# Patient Record
Sex: MALE | Race: BLACK OR AFRICAN AMERICAN | Employment: UNEMPLOYED | URBAN - METROPOLITAN AREA
[De-identification: names, ages, dates, MRNs, and addresses within clinical notes are randomized per-mention and may not be internally consistent; named-entity substitution may affect disease eponyms.]

---

## 2017-03-29 ENCOUNTER — HOSPITAL ENCOUNTER (EMERGENCY)
Age: 47
Discharge: ANOTHER HOSPITAL | End: 2017-03-29
Attending: EMERGENCY MEDICINE
Payer: COMMERCIAL

## 2017-03-29 ENCOUNTER — APPOINTMENT (OUTPATIENT)
Dept: GENERAL RADIOLOGY | Age: 47
End: 2017-03-29
Attending: EMERGENCY MEDICINE
Payer: COMMERCIAL

## 2017-03-29 ENCOUNTER — HOSPITAL ENCOUNTER (OUTPATIENT)
Age: 47
Setting detail: OBSERVATION
Discharge: HOME | DRG: 287 | End: 2017-03-30
Attending: INTERNAL MEDICINE | Admitting: INTERNAL MEDICINE
Payer: COMMERCIAL

## 2017-03-29 VITALS
DIASTOLIC BLOOD PRESSURE: 94 MMHG | OXYGEN SATURATION: 98 % | BODY MASS INDEX: 29.62 KG/M2 | HEART RATE: 78 BPM | HEIGHT: 69 IN | TEMPERATURE: 98.1 F | SYSTOLIC BLOOD PRESSURE: 152 MMHG | RESPIRATION RATE: 20 BRPM | WEIGHT: 200 LBS

## 2017-03-29 DIAGNOSIS — R07.9 CHEST PAIN, UNSPECIFIED TYPE: Primary | ICD-10-CM

## 2017-03-29 PROBLEM — R06.02 SHORTNESS OF BREATH: Status: ACTIVE | Noted: 2017-03-29

## 2017-03-29 LAB
ALBUMIN SERPL BCP-MCNC: 4.1 G/DL (ref 3.5–5)
ALBUMIN/GLOB SERPL: 1 {RATIO} (ref 1.2–3.5)
ALP SERPL-CCNC: 74 U/L (ref 50–136)
ALT SERPL-CCNC: 79 U/L (ref 12–65)
ANION GAP BLD CALC-SCNC: 9 MMOL/L (ref 7–16)
AST SERPL W P-5'-P-CCNC: 45 U/L (ref 15–37)
ATRIAL RATE: 64 BPM
BASOPHILS # BLD AUTO: 0 K/UL (ref 0–0.2)
BASOPHILS # BLD: 0 % (ref 0–2)
BILIRUB SERPL-MCNC: 0.2 MG/DL (ref 0.2–1.1)
BNP SERPL-MCNC: 5 PG/ML
BUN SERPL-MCNC: 10 MG/DL (ref 6–23)
CALCIUM SERPL-MCNC: 9.2 MG/DL (ref 8.3–10.4)
CALCULATED P AXIS, ECG09: 78 DEGREES
CALCULATED R AXIS, ECG10: 34 DEGREES
CALCULATED T AXIS, ECG11: 46 DEGREES
CHLORIDE SERPL-SCNC: 105 MMOL/L (ref 98–107)
CO2 SERPL-SCNC: 26 MMOL/L (ref 21–32)
CREAT SERPL-MCNC: 1.19 MG/DL (ref 0.8–1.5)
D DIMER PPP FEU-MCNC: 0.36 UG/ML(FEU)
DIAGNOSIS, 93000: NORMAL
DIFFERENTIAL METHOD BLD: ABNORMAL
EOSINOPHIL # BLD: 0.2 K/UL (ref 0–0.8)
EOSINOPHIL NFR BLD: 4 % (ref 0.5–7.8)
ERYTHROCYTE [DISTWIDTH] IN BLOOD BY AUTOMATED COUNT: 14.6 % (ref 11.9–14.6)
GLOBULIN SER CALC-MCNC: 4.3 G/DL (ref 2.3–3.5)
GLUCOSE SERPL-MCNC: 98 MG/DL (ref 65–100)
HCT VFR BLD AUTO: 42.2 % (ref 41.1–50.3)
HGB BLD-MCNC: 14.3 G/DL (ref 13.6–17.2)
IMM GRANULOCYTES # BLD: 0 K/UL (ref 0–0.5)
IMM GRANULOCYTES NFR BLD AUTO: 0 % (ref 0–5)
LYMPHOCYTES # BLD AUTO: 47 % (ref 13–44)
LYMPHOCYTES # BLD: 2.4 K/UL (ref 0.5–4.6)
MCH RBC QN AUTO: 26.7 PG (ref 26.1–32.9)
MCHC RBC AUTO-ENTMCNC: 33.9 G/DL (ref 31.4–35)
MCV RBC AUTO: 78.9 FL (ref 79.6–97.8)
MONOCYTES # BLD: 0.7 K/UL (ref 0.1–1.3)
MONOCYTES NFR BLD AUTO: 14 % (ref 4–12)
NEUTS SEG # BLD: 1.8 K/UL (ref 1.7–8.2)
NEUTS SEG NFR BLD AUTO: 35 % (ref 43–78)
P-R INTERVAL, ECG05: 144 MS
PLATELET # BLD AUTO: 227 K/UL (ref 150–450)
PMV BLD AUTO: 12.2 FL (ref 10.8–14.1)
POTASSIUM SERPL-SCNC: 4 MMOL/L (ref 3.5–5.1)
PROT SERPL-MCNC: 8.4 G/DL (ref 6.3–8.2)
Q-T INTERVAL, ECG07: 396 MS
QRS DURATION, ECG06: 92 MS
QTC CALCULATION (BEZET), ECG08: 408 MS
RBC # BLD AUTO: 5.35 M/UL (ref 4.23–5.67)
SODIUM SERPL-SCNC: 140 MMOL/L (ref 136–145)
TROPONIN I BLD-MCNC: 0.01 NG/ML (ref 0–0.08)
TROPONIN I SERPL-MCNC: 0.04 NG/ML (ref 0.02–0.05)
VENTRICULAR RATE, ECG03: 64 BPM
WBC # BLD AUTO: 5.1 K/UL (ref 4.3–11.1)

## 2017-03-29 PROCEDURE — 99218 HC RM OBSERVATION: CPT

## 2017-03-29 PROCEDURE — 74011250637 HC RX REV CODE- 250/637: Performed by: NURSE PRACTITIONER

## 2017-03-29 PROCEDURE — 65660000000 HC RM CCU STEPDOWN

## 2017-03-29 PROCEDURE — 74011250636 HC RX REV CODE- 250/636: Performed by: NURSE PRACTITIONER

## 2017-03-29 PROCEDURE — 74011250637 HC RX REV CODE- 250/637: Performed by: EMERGENCY MEDICINE

## 2017-03-29 PROCEDURE — 84484 ASSAY OF TROPONIN QUANT: CPT

## 2017-03-29 PROCEDURE — 84484 ASSAY OF TROPONIN QUANT: CPT | Performed by: NURSE PRACTITIONER

## 2017-03-29 PROCEDURE — 80053 COMPREHEN METABOLIC PANEL: CPT | Performed by: EMERGENCY MEDICINE

## 2017-03-29 PROCEDURE — 99285 EMERGENCY DEPT VISIT HI MDM: CPT | Performed by: EMERGENCY MEDICINE

## 2017-03-29 PROCEDURE — 80307 DRUG TEST PRSMV CHEM ANLYZR: CPT | Performed by: NURSE PRACTITIONER

## 2017-03-29 PROCEDURE — 36415 COLL VENOUS BLD VENIPUNCTURE: CPT | Performed by: NURSE PRACTITIONER

## 2017-03-29 PROCEDURE — 71010 XR CHEST PORT: CPT

## 2017-03-29 PROCEDURE — 85025 COMPLETE CBC W/AUTO DIFF WBC: CPT | Performed by: EMERGENCY MEDICINE

## 2017-03-29 PROCEDURE — 83880 ASSAY OF NATRIURETIC PEPTIDE: CPT | Performed by: EMERGENCY MEDICINE

## 2017-03-29 PROCEDURE — 85379 FIBRIN DEGRADATION QUANT: CPT | Performed by: EMERGENCY MEDICINE

## 2017-03-29 PROCEDURE — 93005 ELECTROCARDIOGRAM TRACING: CPT | Performed by: EMERGENCY MEDICINE

## 2017-03-29 RX ORDER — GUAIFENESIN 100 MG/5ML
324 LIQUID (ML) ORAL
Status: COMPLETED | OUTPATIENT
Start: 2017-03-29 | End: 2017-03-29

## 2017-03-29 RX ORDER — ZOLPIDEM TARTRATE 5 MG/1
5 TABLET ORAL
Status: DISCONTINUED | OUTPATIENT
Start: 2017-03-29 | End: 2017-03-30 | Stop reason: HOSPADM

## 2017-03-29 RX ORDER — NITROGLYCERIN 0.4 MG/1
0.4 TABLET SUBLINGUAL
Status: DISCONTINUED | OUTPATIENT
Start: 2017-03-29 | End: 2017-03-30 | Stop reason: HOSPADM

## 2017-03-29 RX ORDER — MORPHINE SULFATE 2 MG/ML
2 INJECTION, SOLUTION INTRAMUSCULAR; INTRAVENOUS
Status: DISCONTINUED | OUTPATIENT
Start: 2017-03-29 | End: 2017-03-30 | Stop reason: HOSPADM

## 2017-03-29 RX ORDER — SODIUM CHLORIDE 9 MG/ML
75 INJECTION, SOLUTION INTRAVENOUS CONTINUOUS
Status: DISCONTINUED | OUTPATIENT
Start: 2017-03-30 | End: 2017-03-30 | Stop reason: HOSPADM

## 2017-03-29 RX ORDER — ACETAMINOPHEN 500 MG
1000 TABLET ORAL
Status: COMPLETED | OUTPATIENT
Start: 2017-03-29 | End: 2017-03-29

## 2017-03-29 RX ORDER — SODIUM CHLORIDE 0.9 % (FLUSH) 0.9 %
5-10 SYRINGE (ML) INJECTION EVERY 8 HOURS
Status: DISCONTINUED | OUTPATIENT
Start: 2017-03-29 | End: 2017-03-30 | Stop reason: HOSPADM

## 2017-03-29 RX ORDER — METOPROLOL TARTRATE 25 MG/1
25 TABLET, FILM COATED ORAL EVERY 12 HOURS
Status: DISCONTINUED | OUTPATIENT
Start: 2017-03-29 | End: 2017-03-30 | Stop reason: HOSPADM

## 2017-03-29 RX ORDER — AMLODIPINE BESYLATE 10 MG/1
10 TABLET ORAL DAILY
Status: DISCONTINUED | OUTPATIENT
Start: 2017-03-30 | End: 2017-03-29

## 2017-03-29 RX ORDER — NITROGLYCERIN 0.4 MG/1
0.4 TABLET SUBLINGUAL
Status: DISCONTINUED | OUTPATIENT
Start: 2017-03-29 | End: 2017-03-29 | Stop reason: HOSPADM

## 2017-03-29 RX ORDER — AMLODIPINE BESYLATE 10 MG/1
10 TABLET ORAL DAILY
Status: DISCONTINUED | OUTPATIENT
Start: 2017-03-29 | End: 2017-03-30 | Stop reason: HOSPADM

## 2017-03-29 RX ORDER — GUAIFENESIN 100 MG/5ML
81 LIQUID (ML) ORAL DAILY
Status: DISCONTINUED | OUTPATIENT
Start: 2017-03-30 | End: 2017-03-30 | Stop reason: HOSPADM

## 2017-03-29 RX ORDER — SODIUM CHLORIDE 0.9 % (FLUSH) 0.9 %
5-10 SYRINGE (ML) INJECTION AS NEEDED
Status: DISCONTINUED | OUTPATIENT
Start: 2017-03-29 | End: 2017-03-30 | Stop reason: HOSPADM

## 2017-03-29 RX ORDER — ONDANSETRON 2 MG/ML
4 INJECTION INTRAMUSCULAR; INTRAVENOUS
Status: DISCONTINUED | OUTPATIENT
Start: 2017-03-29 | End: 2017-03-30 | Stop reason: HOSPADM

## 2017-03-29 RX ADMIN — NITROGLYCERIN 0.4 MG: 0.4 TABLET SUBLINGUAL at 11:36

## 2017-03-29 RX ADMIN — NITROGLYCERIN 1 INCH: 20 OINTMENT TOPICAL at 17:59

## 2017-03-29 RX ADMIN — ASPIRIN 81 MG CHEWABLE TABLET 324 MG: 81 TABLET CHEWABLE at 11:35

## 2017-03-29 RX ADMIN — Medication 5 ML: at 21:31

## 2017-03-29 RX ADMIN — METOPROLOL TARTRATE 25 MG: 25 TABLET ORAL at 21:30

## 2017-03-29 RX ADMIN — ZOLPIDEM TARTRATE 5 MG: 5 TABLET ORAL at 23:11

## 2017-03-29 RX ADMIN — AMLODIPINE BESYLATE 10 MG: 10 TABLET ORAL at 17:59

## 2017-03-29 RX ADMIN — Medication 10 ML: at 16:23

## 2017-03-29 RX ADMIN — SODIUM CHLORIDE 75 ML/HR: 900 INJECTION, SOLUTION INTRAVENOUS at 23:11

## 2017-03-29 RX ADMIN — ACETAMINOPHEN 1000 MG: 500 TABLET, FILM COATED ORAL at 11:51

## 2017-03-29 NOTE — ED TRIAGE NOTES
Pt in co sob and left side chest pressure x 1 week. Pt states pain worse with deep breathing. Denies cough/n/v/diaphoresis/chills.   States took aspirin pta

## 2017-03-29 NOTE — ROUTINE PROCESS
TRANSFER - OUT REPORT:    Verbal report given to Marilou RN(name) on Denise Quezada  being transferred to [facility] room #314(unit) for routine progression of care       Report consisted of patients Situation, Background, Assessment and   Recommendations(SBAR). Information from the following report(s) ED Summary was reviewed with the receiving nurse. Lines:   Peripheral IV 03/29/17 Left Antecubital (Active)        Opportunity for questions and clarification was provided.       Patient transported with:   Monitor

## 2017-03-29 NOTE — ED PROVIDER NOTES
HPI Comments: 50-year-old male with history of hypertension and diabetes presents with intermittent episodes of left-sided chest pain and shortness of breath for the past 2 weeks. Symptoms are worse with any exertion, to the point where the patient states he can no longer walk his dog. He also reports shortness of breath with tying his shoes. He had increasing chest pressure and shortness of breath today radiating to his left axilla. Symptoms are now occurring at rest.  He denies any cough, congestion, fever, leg swelling. No history of blood clots. No recent surgeries. No hemoptysis. Denies any prior stress test or cardiac cath. Denies strong family history of heart disease. Patient is a 40 y.o. male presenting with chest pain. The history is provided by the patient. Chest Pain (Angina)    Associated symptoms include shortness of breath. Pertinent negatives include no abdominal pain, no back pain, no cough, no fever, no headaches, no nausea, no palpitations, no vomiting and no weakness.         Past Medical History:   Diagnosis Date    Anxiety state, unspecified     Back pain     Chronic pain     back pain radiating down his LLE    Depressive disorder, not elsewhere classified     Diabetes ([facility] [address] dx 2013    Type 2, oral med, unsure last HA1C, never hypo, does not use glucometer    Dysmetabolic syndrome X 7/1/51    HgbA1C 6.4    Foot drop, left     GERD (gastroesophageal reflux disease)     occ. episode, relieved with tums    Hypercholesterolemia      on 4/1/13    Hypertension     controlled with med    Impotence of organic origin     Insomnia, unspecified 7/6/2015    Thoracic or lumbosacral neuritis or radiculitis, unspecified     Tobacco use disorder        Past Surgical History:   Procedure Laterality Date    BREAST SURGERY PROCEDURE UNLISTED Left     cyst removed    HX ORTHOPAEDIC Left 12/2012    ORIF Left Ankle by Dr. Dinesh Hudson HX ORTHOPAEDIC Left 9/24/13    Tendon repair to treat foot drop in left ankle by Dr. Eunice Griffith      herniated disc, discectomy by Dr Geoff Lucio 11/05         Family History:   Problem Relation Age of Onset    Cancer Mother      breast    Breast Cancer Mother     Hypertension Mother     Diabetes Father     Hypertension Brother     Gout Brother     SLE Son 23       Social History     Social History    Marital status:      Spouse name: N/A    Number of children: N/A    Years of education: N/A     Occupational History    Not on file. Social History Main Topics    Smoking status: Current Some Day Smoker     Packs/day: 0.25     Years: 20.00    Smokeless tobacco: Current User      Comment: 1 can dip a day    Alcohol use Yes      Comment: occ    Drug use: Yes     Special: Marijuana, Prescription      Comment: Last use Marijuana today    Sexual activity: Yes     Partners: Female     Other Topics Concern    Not on file     Social History Narrative         ALLERGIES: Review of patient's allergies indicates no known allergies. Review of Systems   Constitutional: Positive for fatigue. Negative for chills and fever. HENT: Negative for hearing loss. Eyes: Negative for visual disturbance. Respiratory: Positive for shortness of breath. Negative for cough. Cardiovascular: Positive for chest pain. Negative for palpitations. Gastrointestinal: Negative for abdominal pain, diarrhea, nausea and vomiting. Musculoskeletal: Negative for back pain. Skin: Negative for rash. Neurological: Negative for weakness and headaches. Psychiatric/Behavioral: Negative for confusion. Vitals:    03/29/17 1110 03/29/17 1114   BP:  140/82   Pulse: 73    Resp: 17    Temp: 97.9 °F (36.6 °C)    SpO2: 99%    Weight: 90.7 kg (200 lb)    Height: 5' 9\" (1.753 m)             Physical Exam   Constitutional: He appears well-developed and well-nourished. HENT:   Head: Normocephalic and atraumatic.    Right Ear: External ear normal. Left Ear: External ear normal.   Nose: Nose normal.   Mouth/Throat: Oropharynx is clear and moist.   Eyes: Conjunctivae are normal. Pupils are equal, round, and reactive to light. Neck: Normal range of motion. Neck supple. Cardiovascular: Regular rhythm, normal heart sounds and intact distal pulses. Pulmonary/Chest: Effort normal and breath sounds normal. No respiratory distress. He has no wheezes. Abdominal: Soft. Bowel sounds are normal. He exhibits no distension. There is no tenderness. Musculoskeletal: Normal range of motion. He exhibits no edema. Neurological: He is alert. Skin: Skin is warm and dry. Psychiatric: Judgment normal.   Nursing note and vitals reviewed. MDM  Number of Diagnoses or Management Options  Diagnosis management comments: Parts of this document were created using dragon voice recognition software. The chart has been reviewed but errors may still be present. Has not taken Cialis in several days. We'll try nitroglycerin and aspirin. 1:25 PM  Chest pain resolved with one nitroglycerin. Patient having exertional symptoms now occurring at rest.  He has multiple risk factors. Discussed with Dr. Colt Knott, cardiologist.  He recommended transferring patient downtown for further evaluation. Patient updated.        Amount and/or Complexity of Data Reviewed  Clinical lab tests: reviewed and ordered (Results for orders placed or performed during the hospital encounter of 03/29/17  -CBC WITH AUTOMATED DIFF       Result                                            Value                         Ref Range                       WBC                                               5.1                           4.3 - 11.1 K/uL                 RBC                                               5.35                          4.23 - 5.67 M/uL                HGB                                               14.3                          13.6 - 17.2 g/dL                HCT 42.2                          41.1 - 50.3 %                   MCV                                               78.9 (L)                      79.6 - 97.8 FL                  MCH                                               26.7                          26.1 - 32.9 PG                  MCHC                                              33.9                          31.4 - 35.0 g/dL                RDW                                               14.6                          11.9 - 14.6 %                   PLATELET                                          227                           150 - 450 K/uL                  MPV                                               12.2                          10.8 - 14.1 FL                  DF                                                AUTOMATED                                                     NEUTROPHILS                                       35 (L)                        43 - 78 %                       LYMPHOCYTES                                       47 (H)                        13 - 44 %                       MONOCYTES                                         14 (H)                        4.0 - 12.0 %                    EOSINOPHILS                                       4                             0.5 - 7.8 %                     BASOPHILS                                         0                             0.0 - 2.0 %                     IMMATURE GRANULOCYTES                             0.0                           0.0 - 5.0 %                     ABS. NEUTROPHILS                                  1.8                           1.7 - 8.2 K/UL                  ABS. LYMPHOCYTES                                  2.4                           0.5 - 4.6 K/UL                  ABS. MONOCYTES                                    0.7                           0.1 - 1.3 K/UL                  ABS.  EOSINOPHILS                                  0.2 0.0 - 0.8 K/UL                  ABS. BASOPHILS                                    0.0                           0.0 - 0.2 K/UL                  ABS. IMM.  GRANS.                                  0.0                           0.0 - 0.5 K/UL             -METABOLIC PANEL, COMPREHENSIVE       Result                                            Value                         Ref Range                       Sodium                                            140                           136 - 145 mmol/L                Potassium                                         4.0                           3.5 - 5.1 mmol/L                Chloride                                          105                           98 - 107 mmol/L                 CO2                                               26                            21 - 32 mmol/L                  Anion gap                                         9                             7 - 16 mmol/L                   Glucose                                           98                            65 - 100 mg/dL                  BUN                                               10                            6 - 23 MG/DL                    Creatinine                                        1.19                          0.8 - 1.5 MG/DL                 GFR est AA                                        >60                           >60 ml/min/1.73m2               GFR est non-AA                                    >60                           >60 ml/min/1.73m2               Calcium                                           9.2                           8.3 - 10.4 MG/DL                Bilirubin, total                                  0.2                           0.2 - 1.1 MG/DL                 ALT (SGPT)                                        79 (H)                        12 - 65 U/L                     AST (SGOT)                                        45 (H) 15 - 37 U/L                     Alk. phosphatase                                  74                            50 - 136 U/L                    Protein, total                                    8.4 (H)                       6.3 - 8.2 g/dL                  Albumin                                           4.1                           3.5 - 5.0 g/dL                  Globulin                                          4.3 (H)                       2.3 - 3.5 g/dL                  A-G Ratio                                         1.0 (L)                       1.2 - 3.5                  -BNP       Result                                            Value                         Ref Range                       BNP                                               5                             pg/mL                      -D DIMER       Result                                            Value                         Ref Range                       D DIMER                                           0.36                          <0.55 ug/ml(FEU)           -POC TROPONIN-I       Result                                            Value                         Ref Range                       Troponin-I (POC)                                  0.01                          0.0 - 0.08 ng/ml           -EKG, 12 LEAD, INITIAL       Result                                            Value                         Ref Range                       Ventricular Rate                                  64                            BPM                             Atrial Rate                                       64                            BPM                             P-R Interval                                      144                           ms                              QRS Duration                                      92                            ms                              Q-T Interval                                      396 ms                              QTC Calculation (Bezet)                           408                           ms                              Calculated P Axis                                 78                            degrees                         Calculated R Axis                                 34                            degrees                         Calculated T Axis                                 46                            degrees                         Diagnosis                                                                                                   !! AGE AND GENDER SPECIFIC ECG ANALYSIS !!    Sinus rhythm with sinus arrhythmia   Normal ECG   No previous ECGs available     )  Tests in the radiology section of CPT®: reviewed and ordered (No acute)  Tests in the medicine section of CPT®: ordered and reviewed      ED Course       Procedures

## 2017-03-29 NOTE — IP AVS SNAPSHOT
Current Discharge Medication List  
  
START taking these medications Dose & Instructions Dispensing Information Comments Morning Noon Evening Bedtime  
 pantoprazole 40 mg tablet Commonly known as:  PROTONIX Your last dose was: Your next dose is:    
   
   
 Dose:  40 mg Take 1 Tab by mouth daily. Quantity:  30 Tab Refills:  6 CONTINUE these medications which have NOT CHANGED Dose & Instructions Dispensing Information Comments Morning Noon Evening Bedtime  
 amLODIPine 10 mg tablet Commonly known as:  Sunflower Cradle Your last dose was: Your next dose is:    
   
   
 Dose:  10 mg Take 1 Tab by mouth daily. Quantity:  30 Tab Refills:  5  
     
   
   
   
  
 metFORMIN  mg tablet Commonly known as:  GLUCOPHAGE XR Your last dose was: Your next dose is:    
   
   
 Dose:  750 mg Take 1 Tab by mouth daily. Take with food. Quantity:  30 Tab Refills:  5  
     
   
   
   
  
 tadalafil 20 mg tablet Commonly known as:  CIALIS Your last dose was: Your next dose is:    
   
   
 Dose:  20 mg Take 1 Tab by mouth as needed. Quantity:  12 Tab Refills:  1 Where to Get Your Medications Information on where to get these meds will be given to you by the nurse or doctor. ! Ask your nurse or doctor about these medications  
  pantoprazole 40 mg tablet

## 2017-03-29 NOTE — PROGRESS NOTES
Patient arrived to room 314, NSR noted on heart monitor, VSS. Pt oriented to room, educated on call bell, instructed to call for assistance - pt verbalizes understanding, call bell within reach.      Dual skin assessment completed with secondary RN: skin intact, no abnormalities noted

## 2017-03-29 NOTE — PROGRESS NOTES
Bedside and Verbal shift change report given to [address] JAY JAY Daniels (oncoming nurse). Report included the following information SBAR, Kardex, MAR and Recent Results.

## 2017-03-29 NOTE — ED NOTES
Report called to Marilou RN at [facility] room #463. Repeat Troponin due at 1500 reported to accepting nurse.

## 2017-03-29 NOTE — IP AVS SNAPSHOT
Adriana Dawn 
 
 
 [address] [facility] [address] [facility] 
[phone] Patient: Cornelio Ring MRN: [id number] OPL:[id number] You are allergic to the following No active allergies Recent Documentation Height Weight BMI Smoking Status 1.702 m 88 kg 30.38 kg/m2 Current Some Day Smoker Emergency Contacts Name Discharge Info Relation Home Work Mobile Tamy Burns CAREGIVER [3] Spouse [3] [phone] About your hospitalization You were admitted on:  March 29, 2017 You last received care in the:  [facility] 3 TELEMETRY You were discharged on:  March 30, 2017 Unit phone number:  [phone] Why you were hospitalized Your primary diagnosis was:  Unstable Angina (Hcc) Your diagnoses also included:  Hypertension, Prediabetes, Shortness Of Breath Providers Seen During Your Hospitalizations Provider Role Specialty Primary office phone Liban Guzmán MD Attending Provider Cardiology [phone] Your Primary Care Physician (PCP) Primary Care Physician Office Phone Office Fax Nadia Vega [phone] Follow-up Information Follow up With Details Comments Contact Info Ave Trevino MD Schedule an appointment as soon as possible for a visit in 2 weeks  [address] [place] [address] 
[phone] Your Appointments Monday April 03, 2017  2:30 PM EDT Office Visit with Ave Trevino MD  
[facility] ([facility] [address]   
[phone] Current Discharge Medication List  
  
START taking these medications Dose & Instructions Dispensing Information Comments Morning Noon Evening Bedtime  
 pantoprazole 40 mg tablet Commonly known as:  PROTONIX Your last dose was: Your next dose is: Dose:  40 mg Take 1 Tab by mouth daily. Quantity:  30 Tab Refills:  6 CONTINUE these medications which have NOT CHANGED Dose & Instructions Dispensing Information Comments Morning Noon Evening Bedtime  
 amLODIPine 10 mg tablet Commonly known as:  Claudell Hesselbach Your last dose was: Your next dose is:    
   
   
 Dose:  10 mg Take 1 Tab by mouth daily. Quantity:  30 Tab Refills:  5  
     
   
   
   
  
 metFORMIN  mg tablet Commonly known as:  GLUCOPHAGE XR Your last dose was: Your next dose is:    
   
   
 Dose:  750 mg Take 1 Tab by mouth daily. Take with food. Quantity:  30 Tab Refills:  5  
     
   
   
   
  
 tadalafil 20 mg tablet Commonly known as:  CIALIS Your last dose was: Your next dose is:    
   
   
 Dose:  20 mg Take 1 Tab by mouth as needed. Quantity:  12 Tab Refills:  1 Where to Get Your Medications Information on where to get these meds will be given to you by the nurse or doctor. ! Ask your nurse or doctor about these medications  
  pantoprazole 40 mg tablet Discharge Instructions Learning About Percutaneous Coronary Intervention What is percutaneous coronary intervention? Percutaneous coronary intervention (PCI) is the name for procedures to open a blocked coronary artery. The two most common are coronary angioplasty and coronary stent placement. A PCI is a way to open a blocked coronary artery before, during, or after a heart attack. It gets blood flowing to your heart. And it can help prevent heart problems by widening an artery that has been narrowed by fatty buildup (plaque). This also may be called balloon angioplasty. Before a PCI, a doctor does a test to find blocked arteries. The test is called cardiac catheterization.  The doctor puts a tiny tube called a catheter into an artery in your groin or arm. The doctor moves the catheter through the artery to your heart. Then he or she puts a dye into the catheter. This makes your heart's arteries show up on a screen so the doctor can see any blockages. The test also can measure the pressure inside your heart's chambers. If you have a blocked artery, the doctor may do an angioplasty or a coronary stent procedure. In an angioplasty, the doctor uses a catheter with a tiny balloon at the tip. He or she puts it into the blocked area and inflates it. The balloon presses the plaque against the walls of the artery. This makes more room for blood to flow. In most cases, the doctor then puts a stent in the artery. A stent is a small, wire-mesh tube that presses against the walls of the artery. The stent is left in the artery to keep the artery open. This helps blood flow. It also may keep small pieces of plaque from breaking off and causing a heart attack. How is PCI done? PCI is done in a cardiac catheterization laboratory (\"cath lab\"). It is done by a heart specialist called a cardiologist. The whole procedure may take 1½ to 3 hours. You lie on a table under a large X-ray machine. You will get medicine through an IV in one of your veins. It helps you relax and not feel pain. You will be awake during the procedure. But you may not be able to remember much about it. The doctor will inject some medicine into your arm or groin to numb the skin. You will feel a small needle stick. It's like having a blood test. You may feel some pressure when the doctor puts in the catheter. But you will not feel pain. The doctor will look at X-ray pictures on a monitor (like a TV set) to move the catheter to your heart. You may feel warm or flushed for a short time when the doctor injects dye into your artery. The doctor then will inflate a tiny balloon at the end of the catheter.  The balloon is inflated for a brief time. Then it is deflated and removed. The doctor also may use the catheter to put a stent in the artery. What can you expect after PCI? The catheter will be removed. A nurse or doctor may press on a bandage on the opening. This prevents bleeding. After the test, you will be taken to a room where the catheter site and your heart rate, blood pressure, and temperature will be checked several times. If the catheter was put in your groin, you will need to lie still and keep your leg straight for several hours. If the catheter was put in your arm, you may be able to sit up and get out of bed right away. But you will need to keep your arm still for at least one hour. The average hospital stay is 1 to 2 days for most procedures. When you go home, you will get instructions from your doctor to help you recover well and prevent problems. Make sure to drink plenty of fluids (unless your doctor tells you not to) for several hours after the test. This will help flush the dye out of your body. Your doctor will prescribe blood-thinning medicines. You will likely take aspirin plus another blood thinner. It is very important that you take these medicines exactly as directed. They help keep the coronary artery open and reduce your risk of a heart attack. If you have this procedure, you will still need to make lifestyle changes like eating healthy, being active, and not smoking. This will give you the best chance for a longer, healthier life. Follow-up care is a key part of your treatment and safety. Be sure to make and go to all appointments, and call your doctor if you are having problems. It's also a good idea to know your test results and keep a list of the medicines you take. Where can you learn more? Go to http://paulo-sherman.info/. Enter H105 in the search box to learn more about \"Learning About Percutaneous Coronary Intervention. \" Current as of: March 22, 2016 Content Version: 11.2 © 5678-2180 Healthwise, [employer]. Care instructions adapted under license by [employer] (which disclaims liability or warranty for this information). If you have questions about a medical condition or this instruction, always ask your healthcare professional. [address] any warranty or liability for your use of this information. High Blood Pressure: Care Instructions Your Care Instructions If your blood pressure is usually above 140/90, you have high blood pressure, or hypertension. That means the top number is 140 or higher or the bottom number is 90 or higher, or both. Despite what a lot of people think, high blood pressure usually doesn't cause headaches or make you feel dizzy or lightheaded. It usually has no symptoms. But it does increase your risk for heart attack, stroke, and kidney or eye damage. The higher your blood pressure, the more your risk increases. Your doctor will give you a goal for your blood pressure. Your goal will be based on your health and your age. An example of a goal is to keep your blood pressure below 140/90. Lifestyle changes, such as eating healthy and being active, are always important to help lower blood pressure. You might also take medicine to reach your blood pressure goal. 
Follow-up care is a key part of your treatment and safety. Be sure to make and go to all appointments, and call your doctor if you are having problems. It's also a good idea to know your test results and keep a list of the medicines you take. How can you care for yourself at home? Medical treatment · If you stop taking your medicine, your blood pressure will go back up. You may take one or more types of medicine to lower your blood pressure. Be safe with medicines. Take your medicine exactly as prescribed. Call your doctor if you think you are having a problem with your medicine. · Talk to your doctor before you start taking aspirin every day.  Aspirin can help certain people lower their risk of a heart attack or stroke. But taking aspirin isn't right for everyone, because it can cause serious bleeding. · See your doctor regularly. You may need to see the doctor more often at first or until your blood pressure comes down. · If you are taking blood pressure medicine, talk to your doctor before you take decongestants or anti-inflammatory medicine, such as ibuprofen. Some of these medicines can raise blood pressure. · Learn how to check your blood pressure at home. Lifestyle changes · Stay at a healthy weight. This is especially important if you put on weight around the waist. Losing even 10 pounds can help you lower your blood pressure. · If your doctor recommends it, get more exercise. Walking is a good choice. Bit by bit, increase the amount you walk every day. Try for at least 30 minutes on most days of the week. You also may want to swim, bike, or do other activities. · Avoid or limit alcohol. Talk to your doctor about whether you can drink any alcohol. · Try to limit how much sodium you eat to less than 2,300 milligrams (mg) a day. Your doctor may ask you to try to eat less than 1,500 mg a day. · Eat plenty of fruits (such as bananas and oranges), vegetables, legumes, whole grains, and low-fat dairy products. · Lower the amount of saturated fat in your diet. Saturated fat is found in animal products such as milk, cheese, and meat. Limiting these foods may help you lose weight and also lower your risk for heart disease. · Do not smoke. Smoking increases your risk for heart attack and stroke. If you need help quitting, talk to your doctor about stop-smoking programs and medicines. These can increase your chances of quitting for good. When should you call for help? Call 911 anytime you think you may need emergency care.  This may mean having symptoms that suggest that your blood pressure is causing a serious heart or blood vessel problem. Your blood pressure may be over 180/110. For example, call 911 if: 
· You have symptoms of a heart attack. These may include: ¨ Chest pain or pressure, or a strange feeling in the chest. 
¨ Sweating. ¨ Shortness of breath. ¨ Nausea or vomiting. ¨ Pain, pressure, or a strange feeling in the back, neck, jaw, or upper belly or in one or both shoulders or arms. ¨ Lightheadedness or sudden weakness. ¨ A fast or irregular heartbeat. · You have symptoms of a stroke. These may include: 
¨ Sudden numbness, tingling, weakness, or loss of movement in your face, arm, or leg, especially on only one side of your body. ¨ Sudden vision changes. ¨ Sudden trouble speaking. ¨ Sudden confusion or trouble understanding simple statements. ¨ Sudden problems with walking or balance. ¨ A sudden, severe headache that is different from past headaches. · You have severe back or belly pain. Do not wait until your blood pressure comes down on its own. Get help right away. Call your doctor now or seek immediate care if: 
· Your blood pressure is much higher than normal (such as 180/110 or higher), but you don't have symptoms. · You think high blood pressure is causing symptoms, such as: ¨ Severe headache. ¨ Blurry vision. Watch closely for changes in your health, and be sure to contact your doctor if: 
· Your blood pressure measures 140/90 or higher at least 2 times. That means the top number is 140 or higher or the bottom number is 90 or higher, or both. · You think you may be having side effects from your blood pressure medicine. · Your blood pressure is usually normal, but it goes above normal at least 2 times. Where can you learn more? Go to http://paulo-sherman.info/. Enter U380 in the search box to learn more about \"High Blood Pressure: Care Instructions. \" Current as of: August 8, 2016 Content Version: 11.2 © 4605-7221 [employer]. Care instructions adapted under license by [employer] (which disclaims liability or warranty for this information). If you have questions about a medical condition or this instruction, always ask your healthcare professional. [address] any warranty or liability for your use of this information. Angina: Care Instructions Your Care Instructions You have a problem called angina. Angina happens when there is not enough blood flow to your heart muscle. Angina is a sign of coronary artery disease (CAD). CAD occurs when blood vessels that supply the heart become narrowed. Having CAD increases your risk of a heart attack. Chest pain or pressure is the most common symptom of angina. But some people have other symptoms, like: 
· Pain, pressure, or a strange feeling in the back, neck, jaw, or upper belly, or in one or both shoulders or arms. · Shortness of breath. · Nausea or vomiting. · Lightheadedness or sudden weakness. · Fast or irregular heartbeat. Women are somewhat more likely than men to have angina symptoms like shortness of breath, nausea, and back or jaw pain. Angina can be dangerous. That's why it is important to pay attention to your symptoms. Know what is typical for you, learn how to control your symptoms, and understand when you need to get treatment. A change in your usual pattern of symptoms is an emergency. It may mean that you are having a heart attack. The doctor has checked you carefully, but problems can develop later. If you notice any problems or new symptoms, get medical treatment right away. Follow-up care is a key part of your treatment and safety. Be sure to make and go to all appointments, and call your doctor if you are having problems. It's also a good idea to know your test results and keep a list of the medicines you take. How can you care for yourself at home? Medicines · If your doctor has given you nitroglycerin for angina symptoms, keep it with you at all times. If you have symptoms, sit down and rest, and take the first dose of nitroglycerin as directed. If your symptoms get worse or are not getting better within 5 minutes, call 911 right away. Stay on the phone. The emergency  will give you further instructions. · If your doctor advises it, take 1 low-dose aspirin a day to prevent heart attack. · Be safe with medicines. Take your medicines exactly as prescribed. Call your doctor if you think you are having a problem with your medicine. You will get more details on the specific medicines your doctor prescribes. Lifestyle changes · Do not smoke. If you need help quitting, talk to your doctor about stop-smoking programs and medicines. These can increase your chances of quitting for good. · Eat a heart-healthy diet that is low in saturated fat and salt, and is high in fiber. Talk to your doctor or a dietitian about healthy eating. · Stay at a healthy weight. Or lose weight if you need to. Activity · Talk to your doctor about a level of activity that is safe for you. · If an activity causes angina symptoms, stop and rest. 
When should you call for help? Call 911 anytime you think you may need emergency care. For example, call if: 
· You passed out (lost consciousness). · You have symptoms of a heart attack. These may include: ¨ Chest pain or pressure, or a strange feeling in the chest. 
¨ Sweating. ¨ Shortness of breath. ¨ Nausea or vomiting. ¨ Pain, pressure, or a strange feeling in the back, neck, jaw, or upper belly or in one or both shoulders or arms. ¨ Lightheadedness or sudden weakness. ¨ A fast or irregular heartbeat. After you call 911, the  may tell you to chew 1 adult-strength or 2 to 4 low-dose aspirin. Wait for an ambulance. Do not try to drive yourself.  
· You have angina symptoms that do not go away with rest or are not getting better within 5 minutes after you take a dose of nitroglycerin. Call your doctor now or seek immediate medical care if: 
· You are having angina symptoms more often than usual, or they are different or worse than usual. 
· You feel dizzy or lightheaded, or you feel like you may faint. Watch closely for changes in your health, and be sure to contact your doctor if you have any problems. Where can you learn more? Go to http://paulo-sherman.info/. Enter H129 in the search box to learn more about \"Angina: Care Instructions. \" Current as of: January 27, 2016 Content Version: 11.2 © 9340-5449 [employer]. Care instructions adapted under license by [employer] (which disclaims liability or warranty for this information). If you have questions about a medical condition or this instruction, always ask your healthcare professional. [place] [address] any warranty or liability for your use of this information. DISCHARGE SUMMARY from Nurse The following personal items are in your possession at time of discharge: 
 
Dental Appliances: None Visual Aid: At bedside, Glasses Home Medications: None Jewelry: Pedro Morris Clothing: At bedside, Footwear, Pajamas, Pants, Shirt, Socks, Undergarments, With patient Other Valuables: Cell Phone, Ni Clark Personal Items Sent to Safe: pt declines PATIENT INSTRUCTIONS: 
 
After general anesthesia or intravenous sedation, for 24 hours or while taking prescription Narcotics: · Limit your activities · Do not drive and operate hazardous machinery · Do not make important personal or business decisions · Do  not drink alcoholic beverages · If you have not urinated within 8 hours after discharge, please contact your surgeon on call. Report the following to your surgeon: 
· Excessive pain, swelling, redness or odor of or around the surgical area · Temperature over 100.5 · Nausea and vomiting lasting longer than 4 hours or if unable to take medications · Any signs of decreased circulation or nerve impairment to extremity: change in color, persistent  numbness, tingling, coldness or increase pain · Any questions What to do at Home: 
Recommended activity: Activity as tolerated and Activity as tolerated and no driving for today The patient is being discharged home in stable condition on a low saturated fat, low cholesterol and low salt diet. The patient is instructed to advance activities as tolerated to the limit of fatigue or shortness of breath. The patient is instructed to avoid all heavy lifting for 3-5 days. The patient is instructed to watch the cath site for bleeding/oozing; if seen, the patient is instructed to apply firm pressure with a clean cloth and call [address] [facility] Cardiology at [phone]. The patient is instructed to watch for signs of infection which include: increasing area of redness, fever/hot to touch or purulent drainage at the catheterization site. The patient is instructed not to soak in a bathtub for 7-10 days, but is cleared to shower. The patient is instructed to call the office or return to the ER for immediate evaluation for any severe shortness of breath or chest pain. *  Please give a list of your current medications to your Primary Care Provider. *  Please update this list whenever your medications are discontinued, doses are 
    changed, or new medications (including over-the-counter products) are added. *  Please carry medication information at all times in case of emergency situations. These are general instructions for a healthy lifestyle: No smoking/ No tobacco products/ Avoid exposure to second hand smoke Surgeon General's Warning:  Quitting smoking now greatly reduces serious risk to your health. Obesity, smoking, and sedentary lifestyle greatly increases your risk for illness A healthy diet, regular physical exercise & weight monitoring are important for maintaining a healthy lifestyle You may be retaining fluid if you have a history of heart failure or if you experience any of the following symptoms:  Weight gain of 3 pounds or more overnight or 5 pounds in a week, increased swelling in our hands or feet or shortness of breath while lying flat in bed. Please call your doctor as soon as you notice any of these symptoms; do not wait until your next office visit. Recognize signs and symptoms of STROKE: 
 
F-face looks uneven A-arms unable to move or move unevenly S-speech slurred or non-existent T-time-call 911 as soon as signs and symptoms begin-DO NOT go Back to bed or wait to see if you get better-TIME IS BRAIN. Warning Signs of HEART ATTACK Call 911 if you have these symptoms: 
? Chest discomfort. Most heart attacks involve discomfort in the center of the chest that lasts more than a few minutes, or that goes away and comes back. It can feel like uncomfortable pressure, squeezing, fullness, or pain. ? Discomfort in other areas of the upper body. Symptoms can include pain or discomfort in one or both arms, the back, neck, jaw, or stomach. ? Shortness of breath with or without chest discomfort. ? Other signs may include breaking out in a cold sweat, nausea, or lightheadedness. Don't wait more than five minutes to call [address]! Fast action can save your life. Calling 911 is almost always the fastest way to get lifesaving treatment. Emergency Medical Services staff can begin treatment when they arrive  up to an hour sooner than if someone gets to the hospital by car. The discharge information has been reviewed with the patient. The patient verbalized understanding. Discharge medications reviewed with the patient and appropriate educational materials and side effects teaching were provided. Cardiac Catheterization/Angiography Discharge Instructions *Check the puncture site frequently for swelling or bleeding. If you see any bleeding, lie down and apply pressure over the area with a clean town or washcloth. Notify your doctor for any redness, swelling, drainage or oozing from the puncture site. Notify your doctor for any fever or chills. *If the leg or arm with the puncture becomes cold, numb or painful, call [facility] Cardiology at [phone]. *Activity should be limited for the next 48 hours. Climb stairs as little as possible and avoid any stooping, bending or strenuous activity for 48 hours. No heavy lifting (anything over 10 pounds) for three days. *Do not drive for 48 hours. *You may resume your usual diet. Drink more fluids than usual. 
 
*Have a responsible person drive you home and stay with you for at least 24 hours after your heart catheterization/angiography. *You may remove the bandage from your right wrist in 24 hours. You may shower in 24 hours. No tub baths, hot tubs or swimming for one week. Do not place any lotions, creams, powders, ointments over the puncture site for one week. You may place a clean band-aid over the puncture site each day for 5 days. Change this daily. Heart-Healthy Diet: Care Instructions Your Care Instructions A heart-healthy diet has lots of vegetables, fruits, nuts, beans, and whole grains, and is low in salt. It limits foods that are high in saturated fat, such as meats, cheeses, and fried foods. It may be hard to change your diet, but even small changes can lower your risk of heart attack and heart disease. Follow-up care is a key part of your treatment and safety. Be sure to make and go to all appointments, and call your doctor if you are having problems. It's also a good idea to know your test results and keep a list of the medicines you take. How can you care for yourself at home? Watch your portions · Learn what a serving is. A \"serving\" and a \"portion\" are not always the same thing. Make sure that you are not eating larger portions than are recommended. For example, a serving of pasta is ½ cup. A serving size of meat is 2 to 3 ounces. A 3-ounce serving is about the size of a deck of cards. Measure serving sizes until you are good at [place]" them. Keep in mind that restaurants often serve portions that are 2 or 3 times the size of one serving. · To keep your energy level up and keep you from feeling hungry, eat often but in smaller portions. · Eat only the number of calories you need to stay at a healthy weight. If you need to lose weight, eat fewer calories than your body burns (through exercise and other physical activity). Eat more fruits and vegetables · Eat a variety of fruit and vegetables every day. Dark green, deep orange, red, or yellow fruits and vegetables are especially good for you. Examples include spinach, carrots, peaches, and berries. · Keep carrots, celery, and other veggies handy for snacks. Buy fruit that is in season and store it where you can see it so that you will be tempted to eat it. · Cook dishes that have a lot of veggies in them, such as stir-fries and soups. Limit saturated and trans fat · Read food labels, and try to avoid saturated and trans fats. They increase your risk of heart disease. Trans fat is found in many processed foods such as cookies and crackers. · Use olive or canola oil when you cook. Try cholesterol-lowering spreads, such as Benecol or Take Control. · Bake, broil, grill, or steam foods instead of frying them. · Choose lean meats instead of high-fat meats such as hot dogs and sausages. Cut off all visible fat when you prepare meat. · Eat fish, skinless poultry, and meat alternatives such as soy products instead of high-fat meats. Soy products, such as tofu, may be especially good for your heart. · Choose low-fat or fat-free milk and dairy products. Eat fish · Eat at least two servings of fish a week. Certain fish, such as salmon and tuna, contain omega-3 fatty acids, which may help reduce your risk of heart attack. Eat foods high in fiber · Eat a variety of grain products every day. Include whole-grain foods that have lots of fiber and nutrients. Examples of whole-grain foods include oats, whole wheat bread, and brown rice. · Buy whole-grain breads and cereals, instead of white bread or pastries. Limit salt and sodium · Limit how much salt and sodium you eat to help lower your blood pressure. · Taste food before you salt it. Add only a little salt when you think you need it. With time, your taste buds will adjust to less salt. · Eat fewer snack items, fast foods, and other high-salt, processed foods. Check food labels for the amount of sodium in packaged foods. · Choose low-sodium versions of canned goods (such as soups, vegetables, and beans). Limit sugar · Limit drinks and foods with added sugar. These include candy, desserts, and soda pop. Limit alcohol · Limit alcohol to no more than 2 drinks a day for men and 1 drink a day for women. Too much alcohol can cause health problems. When should you call for help? Watch closely for changes in your health, and be sure to contact your doctor if: 
· You would like help planning heart-healthy meals. Where can you learn more? Go to http://paulo-sherman.info/. Enter V137 in the search box to learn more about \"Heart-Healthy Diet: Care Instructions. \" Current as of: January 27, 2016 Content Version: 11.2 © 9257-6638 [employer]. Care instructions adapted under license by [employer] (which disclaims liability or warranty for this information).  If you have questions about a medical condition or this instruction, always ask your healthcare professional. [employer] disclaims any warranty or liability for your use of this information. Discharge Orders None [email or web address] Announcement We are excited to announce that we are making your provider's discharge notes available to you in [email or web address]. You will see these notes when they are completed and signed by the physician that discharged you from your recent hospital stay. If you have any questions or concerns about any information you see in [email or web address], please call the Health Information Department where you were seen or reach out to your Primary Care Provider for more information about your plan of care. Introducing [facility]! Gianna Garett introduces Chosen[employer]fm patient portal. Now you can access parts of your medical record, email your doctor's office, and request medication refills online. 1. In your internet browser, go to [email or web address] 2. Click on the First Time User? Click Here link in the Sign In box. You will see the New Member Sign Up page. 3. Enter your [email or web address] Access Code exactly as it appears below. You will not need to use this code after youve completed the sign-up process. If you do not sign up before the expiration date, you must request a new code. · [email or web address] Access Code: [email or web address] Expires: 6/15/2017 10:38 AM 
 
4. Enter the last four digits of your Social Security Number (xxxx) and Date of Birth (mm/dd/yyyy) as indicated and click Submit. You will be taken to the next sign-up page. 5. Create a Chosen[employer]fm ID. This will be your Chosen[employer]fm login ID and cannot be changed, so think of one that is secure and easy to remember. 6. Create a Chosen[employer]fm password. You can change your password at any time. 7. Enter your Password Reset Question and Answer. This can be used at a later time if you forget your password. 8. Enter your e-mail address. You will receive e-mail notification when new information is available in [address] 9. Click Sign Up. You can now view and download portions of your medical record. 10. Click the Download Summary menu link to download a portable copy of your medical information. If you have questions, please visit the Frequently Asked Questions section of the [employer] website. Remember, [employer] is NOT to be used for urgent needs. For medical emergencies, dial 911. Now available from your iPhone and Android! General Information Please provide this summary of care documentation to your next provider. Patient Signature:  ____________________________________________________________ Date:  ____________________________________________________________  
  
 Ilan Provider Signature:  ____________________________________________________________ Date:  ____________________________________________________________

## 2017-03-29 NOTE — H&P
[facility] CARDIOLOGY History &Physical                 Primary Cardiologist: LIDIA    Primary Care Physician: Dr. Alex Randolph    Admitting Physician: Dr. Iqra Irwin:     Patient is a 40 y.o. male with prior h/o HTN, pre DM on metformin and chronic back pain who presented to ED at [facility] with left sided chest pain that radiates to left axilla and some SOB over last 2 weeks. Describes chest pain as \"pressure\" that worsens with exertion and at times noted with deep breathing. Lasting few seconds,on scale 7/10. He reports now unable to walk his dog and occurring now with rest as well. In ED, labs unremarkable including D. Dimer and first troponin. EKG with no acute ischemia. NTG x1 given with complete resolution of his sx. + Marijuana usage yesterday. Currently chest pain free, denies any SOB or left arm pain.          Past Medical History:   Diagnosis Date    Anxiety state, unspecified     Back pain     Chronic pain     back pain radiating down his LLE    Depressive disorder, not elsewhere classified     Diabetes ([facility] [address] dx 2013    Type 2, oral med, unsure last HA1C, never hypo, does not use glucometer    Dysmetabolic syndrome X 2/7/19    HgbA1C 6.4    Foot drop, left     GERD (gastroesophageal reflux disease)     occ. episode, relieved with tums    Hypercholesterolemia      on 4/1/13    Hypertension     controlled with med    Impotence of organic origin     Insomnia, unspecified 7/6/2015    Thoracic or lumbosacral neuritis or radiculitis, unspecified     Tobacco use disorder       Past Surgical History:   Procedure Laterality Date    BREAST SURGERY PROCEDURE UNLISTED Left     cyst removed    HX ORTHOPAEDIC Left 12/2012    ORIF Left Ankle by Dr. Arias Estimable HX ORTHOPAEDIC Left 9/24/13    Tendon repair to treat foot drop in left ankle by Dr. Samantha Mckeon      herniated disc, discectomy by Dr Cher Doran 11/05      No Known Allergies  Social History   Substance Use Topics  Smoking status: Current Some Day Smoker     Packs/day: 0.25     Years: 20.00    Smokeless tobacco: Current User      Comment: 1 can dip a day    Alcohol use Yes      Comment: occ      FH:   Family History   Problem Relation Age of Onset    Cancer Mother      breast    Breast Cancer Mother     Hypertension Mother     Diabetes Father     Hypertension Brother     Gout Brother     SLE Son 23        Review of Systems   Constitution: Negative for diaphoresis, weakness and malaise/fatigue. HENT: Negative for congestion and headaches. Cardiovascular: Positive for chest pain. Negative for claudication, cyanosis, dyspnea on exertion, irregular heartbeat, leg swelling, near-syncope, orthopnea, palpitations, paroxysmal nocturnal dyspnea and syncope. Respiratory: Positive for shortness of breath. Negative for cough and wheezing. Endocrine: Negative for cold intolerance and heat intolerance. Hematologic/Lymphatic: Does not bruise/bleed easily. Skin: Negative for nail changes. Neurological: Negative for dizziness. ROS      Objective:           Visit Vitals    BP (!) 154/110 (BP 1 Location: Right arm, BP Patient Position: Sitting)    Pulse 70    Temp 97.9 °F (36.6 °C)    Resp 20    Ht 5' 7\" (1.702 m)    Wt 88.5 kg (195 lb 3.2 oz)    SpO2 99%    BMI 30.57 kg/m2          Physical Exam:  General: Well Developed, Well Nourished, No Acute Distress  HEENT: pupils equal and round, no abnormalities noted  Neck: supple, no JVD, no carotid bruits  Heart: S1S2 with RRR without murmurs or gallops  Lungs: Clear throughout auscultation bilaterally without adventitious sounds  Chest: slightly tenderness to palpation over 2nd ICS on left side. Abd: soft, nontender, nondistended, with good bowel sounds  Ext: warm, no edema, calves supple/nontender, pulses 2+ bilaterally.  + foot drop/brace  Skin: warm and dry  Psychiatric: Normal mood and affect  Neurologic: Alert and oriented X 3      ECG: Sinus rhythm with sinus arrhythmia     Data Review:   Recent Labs      03/29/17   1158  03/29/17   1118   NA   --   140   K   --   4.0   BUN   --   10   CREA   --   1.19   GLU   --   98   WBC   --   5.1   HGB   --   14.3   HCT   --   42.2   PLT   --   227   TNIPOC  0.01   --          CXR: No acute findings    Assessment/Plan:   Principal Problem:  Chest pain (3/29/2017)- admit to tele; serial troponin. Chest pain now noted at rest as well. Will continue asa. Add NTG paste and low dose BB. Plan LHC in am. Start IVFs tonight. Active Problems:  Prediabetes (4/1/2013)- hold metformin with pending LHC. Overview: HgbA1C 6.4    Hypertension ()- not controlled; not given home med today, will give home med norvasc now and start low dose BB. Shortness of breath (3/29/2017)- See above, baseline Echo. Marijuana usage- UDS today    Tobacco abuse- discuss cessation.        [facility], ADELINA  3/29/2017  2:01 PM

## 2017-03-30 VITALS
HEIGHT: 67 IN | WEIGHT: 194 LBS | OXYGEN SATURATION: 95 % | BODY MASS INDEX: 30.45 KG/M2 | HEART RATE: 54 BPM | RESPIRATION RATE: 18 BRPM | TEMPERATURE: 96.8 F | DIASTOLIC BLOOD PRESSURE: 77 MMHG | SYSTOLIC BLOOD PRESSURE: 125 MMHG

## 2017-03-30 PROBLEM — I20.0 UNSTABLE ANGINA (HCC): Status: ACTIVE | Noted: 2017-03-29

## 2017-03-30 LAB
AMPHET UR QL SCN: NEGATIVE
ANION GAP BLD CALC-SCNC: 7 MMOL/L (ref 7–16)
BARBITURATES UR QL SCN: NEGATIVE
BENZODIAZ UR QL: NEGATIVE
BUN SERPL-MCNC: 9 MG/DL (ref 6–23)
CALCIUM SERPL-MCNC: 9.2 MG/DL (ref 8.3–10.4)
CANNABINOIDS UR QL SCN: POSITIVE
CHLORIDE SERPL-SCNC: 105 MMOL/L (ref 98–107)
CHOLEST SERPL-MCNC: 190 MG/DL
CO2 SERPL-SCNC: 26 MMOL/L (ref 21–32)
COCAINE UR QL SCN: POSITIVE
CREAT SERPL-MCNC: 1.36 MG/DL (ref 0.8–1.5)
GLUCOSE SERPL-MCNC: 98 MG/DL (ref 65–100)
HDLC SERPL-MCNC: 40 MG/DL (ref 40–60)
HDLC SERPL: 4.8 {RATIO}
LDLC SERPL CALC-MCNC: 129.2 MG/DL
LIPID PROFILE,FLP: ABNORMAL
METHADONE UR QL: NEGATIVE
OPIATES UR QL: NEGATIVE
PCP UR QL: NEGATIVE
POTASSIUM SERPL-SCNC: 4.4 MMOL/L (ref 3.5–5.1)
SODIUM SERPL-SCNC: 138 MMOL/L (ref 136–145)
TRIGL SERPL-MCNC: 104 MG/DL (ref 35–150)
TROPONIN I SERPL-MCNC: 0.03 NG/ML (ref 0.02–0.05)
VLDLC SERPL CALC-MCNC: 20.8 MG/DL (ref 6–23)

## 2017-03-30 PROCEDURE — C1769 GUIDE WIRE: HCPCS

## 2017-03-30 PROCEDURE — 74011250637 HC RX REV CODE- 250/637: Performed by: NURSE PRACTITIONER

## 2017-03-30 PROCEDURE — 99152 MOD SED SAME PHYS/QHP 5/>YRS: CPT

## 2017-03-30 PROCEDURE — C1894 INTRO/SHEATH, NON-LASER: HCPCS

## 2017-03-30 PROCEDURE — 93458 L HRT ARTERY/VENTRICLE ANGIO: CPT

## 2017-03-30 PROCEDURE — 74011250636 HC RX REV CODE- 250/636

## 2017-03-30 PROCEDURE — 74011636320 HC RX REV CODE- 636/320: Performed by: INTERNAL MEDICINE

## 2017-03-30 PROCEDURE — 80061 LIPID PANEL: CPT | Performed by: NURSE PRACTITIONER

## 2017-03-30 PROCEDURE — 77030015766

## 2017-03-30 PROCEDURE — 74011000250 HC RX REV CODE- 250: Performed by: INTERNAL MEDICINE

## 2017-03-30 PROCEDURE — 77030029997 HC DEV COM RDL R BND TELE -B

## 2017-03-30 PROCEDURE — 99218 HC RM OBSERVATION: CPT

## 2017-03-30 PROCEDURE — 36415 COLL VENOUS BLD VENIPUNCTURE: CPT | Performed by: NURSE PRACTITIONER

## 2017-03-30 PROCEDURE — 77030004534 HC CATH ANGI DX INFN CARD -A

## 2017-03-30 PROCEDURE — 74011250636 HC RX REV CODE- 250/636: Performed by: INTERNAL MEDICINE

## 2017-03-30 PROCEDURE — 80048 BASIC METABOLIC PNL TOTAL CA: CPT | Performed by: PHYSICIAN ASSISTANT

## 2017-03-30 PROCEDURE — 93306 TTE W/DOPPLER COMPLETE: CPT

## 2017-03-30 PROCEDURE — 84484 ASSAY OF TROPONIN QUANT: CPT | Performed by: PHYSICIAN ASSISTANT

## 2017-03-30 RX ORDER — SODIUM CHLORIDE 9 MG/ML
75 INJECTION, SOLUTION INTRAVENOUS CONTINUOUS
Status: DISCONTINUED | OUTPATIENT
Start: 2017-03-30 | End: 2017-03-30 | Stop reason: HOSPADM

## 2017-03-30 RX ORDER — LIDOCAINE HYDROCHLORIDE 20 MG/ML
1-20 INJECTION, SOLUTION INFILTRATION; PERINEURAL
Status: DISCONTINUED | OUTPATIENT
Start: 2017-03-30 | End: 2017-03-30 | Stop reason: HOSPADM

## 2017-03-30 RX ORDER — FENTANYL CITRATE 50 UG/ML
25-100 INJECTION, SOLUTION INTRAMUSCULAR; INTRAVENOUS
Status: DISCONTINUED | OUTPATIENT
Start: 2017-03-30 | End: 2017-03-30 | Stop reason: HOSPADM

## 2017-03-30 RX ORDER — MIDAZOLAM HYDROCHLORIDE 1 MG/ML
.5-5 INJECTION, SOLUTION INTRAMUSCULAR; INTRAVENOUS
Status: DISCONTINUED | OUTPATIENT
Start: 2017-03-30 | End: 2017-03-30 | Stop reason: HOSPADM

## 2017-03-30 RX ORDER — ATORVASTATIN CALCIUM 40 MG/1
80 TABLET, FILM COATED ORAL
Status: DISCONTINUED | OUTPATIENT
Start: 2017-03-30 | End: 2017-03-30 | Stop reason: HOSPADM

## 2017-03-30 RX ORDER — SODIUM CHLORIDE 0.9 % (FLUSH) 0.9 %
5-10 SYRINGE (ML) INJECTION AS NEEDED
Status: DISCONTINUED | OUTPATIENT
Start: 2017-03-30 | End: 2017-03-30 | Stop reason: HOSPADM

## 2017-03-30 RX ORDER — SODIUM CHLORIDE 0.9 % (FLUSH) 0.9 %
5-10 SYRINGE (ML) INJECTION EVERY 8 HOURS
Status: DISCONTINUED | OUTPATIENT
Start: 2017-03-30 | End: 2017-03-30 | Stop reason: HOSPADM

## 2017-03-30 RX ORDER — HEPARIN SODIUM 200 [USP'U]/100ML
3 INJECTION, SOLUTION INTRAVENOUS CONTINUOUS
Status: DISCONTINUED | OUTPATIENT
Start: 2017-03-30 | End: 2017-03-30 | Stop reason: HOSPADM

## 2017-03-30 RX ORDER — PANTOPRAZOLE SODIUM 40 MG/1
40 TABLET, DELAYED RELEASE ORAL DAILY
Qty: 30 TAB | Refills: 6 | Status: SHIPPED | OUTPATIENT
Start: 2017-03-30 | End: 2017-07-14 | Stop reason: SDUPTHER

## 2017-03-30 RX ADMIN — HEPARIN SODIUM 2 ML: 10000 INJECTION, SOLUTION INTRAVENOUS; SUBCUTANEOUS at 11:24

## 2017-03-30 RX ADMIN — AMLODIPINE BESYLATE 10 MG: 10 TABLET ORAL at 08:46

## 2017-03-30 RX ADMIN — IOPAMIDOL 110 ML: 755 INJECTION, SOLUTION INTRAVENOUS at 11:37

## 2017-03-30 RX ADMIN — ASPIRIN 81 MG: 81 TABLET, CHEWABLE ORAL at 08:45

## 2017-03-30 RX ADMIN — NITROGLYCERIN 1 INCH: 20 OINTMENT TOPICAL at 08:46

## 2017-03-30 RX ADMIN — LIDOCAINE HYDROCHLORIDE 60 MG: 20 INJECTION, SOLUTION INFILTRATION; PERINEURAL at 11:23

## 2017-03-30 RX ADMIN — FENTANYL CITRATE 25 MCG: 50 INJECTION, SOLUTION INTRAMUSCULAR; INTRAVENOUS at 11:20

## 2017-03-30 RX ADMIN — METOPROLOL TARTRATE 25 MG: 25 TABLET ORAL at 08:45

## 2017-03-30 RX ADMIN — MIDAZOLAM HYDROCHLORIDE 2 MG: 1 INJECTION, SOLUTION INTRAMUSCULAR; INTRAVENOUS at 11:19

## 2017-03-30 RX ADMIN — HEPARIN SODIUM 3 ML/HR: 200 INJECTION, SOLUTION INTRAVENOUS at 11:07

## 2017-03-30 NOTE — PROGRESS NOTES
Radial compression band removed at 1330 after slowly reducing air from 12 cc to zero as per hospital protocol. No bleeding or hematoma noted. 2 x 2 gauze with tegaderm placed over puncture site. The affected extremity is warm and dry to the touch. Frequent vital signs documented per flowheet. Patient instructed to call if any bleeding noted on gauze. Patient verbalized understanding the nursing instructions.

## 2017-03-30 NOTE — PROCEDURES
[address] [facility] 45       Name:  Darrell Casiano   MR#:  [id number]   :  1970   Account #:  [de-identified]   Date of Adm:  2017       DATE OF PROCEDURE: 2017. REFERRING PHYSICIAN: Natalie Elise MD.    PRIMARY CARE PHYSICIAN: Carroll Kendrick MD.    REASON FOR PROCEDURE: Recurrent substernal chest pain in this   hypertensive diabetic concerning for angina. PROCEDURE PERFORMED: Left heart catheterization with coronary   angiography and left ventriculogram.      TOTAL CONTRAST ADMINISTERED: 110 mL of Isovue. CONSCIOUS SEDATION: 2 mg of Versed and 25 mcg fentanyl with a   total monitoring time of about 30 minutes. PROCEDURE TECHNIQUE: After informed consent was obtained, the   patient was brought to the cath lab and prepped and draped in   the usual fashion. A 6-Khmer sheath was placed in the right   radial artery via the modified Seldinger technique using a   micropuncture wire. Left heart catheterization was performed   using standard 5-Khmer angled pigtail and Tiger catheters. Manual pressure will be applied to the patient's access site via   TR band protocol. PRESSURE RESULTS: Left ventricle 120/10-15. Aorta 119/79. Left ventriculogram reveals normal left ventricular regional   wall motion with ejection fraction greater than 55%. There is   trace mitral regurgitation. There is no aortic valve gradient on   catheter pullback. Left ventricular end-diastolic pressure is   high normal to mildly elevated. CORONARY ANATOMY: Of note, there is endothelial dysfunction with   somewhat sluggish GEOVANNY-II flow throughout the entire epicardial   coronary tree, but worst in the LAD distribution. Left main is large and angiographically normal, dividing into an   LAD and circumflex in the usual fashion. The LAD supplies a high first diagonal which bifurcates. The LAD   then continues around the apex.  The diagonal has mild irregularities up to 10% to 20% proximally, but otherwise   minimal irregularities. The LAD is a large vessel, which has   mild luminal irregularities at most.      The circumflex bifurcates into 2 fairly large obtuse marginal   branches which has minimal irregularity. The right coronary has a somewhat anterior takeoff, but is an   anatomically dominant vessel. It bifurcates early into posterior   descending and posterolateral branches. There is GEOVANNY 2 flow in   this vessel as well, however, underlying there are no   significant irregularities more than minimal luminal   irregularities in the distal right coronary proper. The   remainder of the vessel appears normal.    CONCLUSIONS:    1. Minimal coronary irregularities at most.   2. Normal left ventricular regional wall motion and ejection   fraction. 3. GEOVANNY 2 flow in the LAD and right coronary consistent with   endothelial dysfunction.         MD Mirna Torres / Calixto Middleton   D:  03/30/2017   11:45   T:  03/30/2017   11:59   Job #:  [id number]

## 2017-03-30 NOTE — PROGRESS NOTES
Bedside and Verbal shift change report given to [facility]. Report included the following information SBAR, Kardex, MAR, Accordion and Recent Results.

## 2017-03-30 NOTE — PROGRESS NOTES
TRANSFER - OUT REPORT:    Verbal report given to [address] [place] [address] & [address] on [address]  being transferred to cath lab for routine progression of care       Report consisted of patients Situation, Background, Assessment and   Recommendations(SBAR). Information from the following report(s) Kardex, Intake/Output and MAR was reviewed with the receiving nurse. Lines:   Peripheral IV 03/29/17 Left Antecubital (Active)   Site Assessment Clean, dry, & intact 3/30/2017  7:15 AM   Phlebitis Assessment 0 3/30/2017  7:15 AM   Infiltration Assessment 0 3/30/2017  7:15 AM   Dressing Status Clean, dry, & intact 3/30/2017  7:15 AM   Dressing Type Tape;Transparent 3/30/2017  7:15 AM   Hub Color/Line Status Infusing 3/30/2017  7:15 AM        Opportunity for questions and clarification was provided.

## 2017-03-30 NOTE — PROGRESS NOTES
[facility] CARDIOLOGY PROGRESS NOTE           3/30/2017 7:18 AM    Admit Date: 3/29/2017         Subjective: Patient has some pain in left axilla, left arm. No chest pain currently. He is not tender to palpation. ROS:  Cardiovascular:  As noted above    Objective:      Vitals:    03/29/17 1615 03/29/17 2037 03/30/17 0113 03/30/17 0446   BP: 125/72 (!) 133/95 152/75 108/69   Pulse: 62 96 72 (!) 55   Resp: 20 18 18 18   Temp: 97.8 °F (36.6 °C) 98.5 °F (36.9 °C) 98.1 °F (36.7 °C) 98 °F (36.7 °C)   SpO2: 98% 98% 100% 98%   Weight:    88 kg (194 lb)   Height:           On telemetry: sinus bradycardia       Physical Exam:  General: Well Developed, Well Nourished, No Acute Distress, Alert & Oriented x 3, Appropriate mood  Neck: supple, no JVD  Heart: S1S2 with RRR  Lungs: Clear throughout auscultation bilaterally without adventitious sounds  Abd: soft, nontender, nondistended, with good bowel sounds  Ext: no edema bilaterally  Skin: warm and dry      Data Review:   Recent Labs      03/29/17   1118   NA  140   K  4.0   BUN  10   CREA  1.19   GLU  98   WBC  5.1   HGB  14.3   HCT  42.2   PLT  227       Recent Labs      03/29/17   2120  03/29/17   1158   TNIPOC   --   0.01   TROIQ  0.04   --            Assessment/Plan:     Principal Problem:    Unstable angina (3/29/2017)-repeat troponin pending, will proceed with [facility]/possible PCI today, continue ASA, BB, start statin    Active Problems:    Prediabetes (4/1/2013)-Metformin held for [facility]      Hypertension -on Norvasc, BB started, monitor      Shortness of breath (3/29/2017)-as above, for [employer]  3/30/2017 7:18 AM    ATTENDING ADDENDUM:    Patient seen and examined by me. Agree with above note by physician extender. Key findings are:  No CP or HERNANDEZ but persistent left focal pinpoint axillary pain, not worse with palpation.   Check troponin now, [facility] this AM. The benefits and risks of left heart catheterization and possible percutaneous intervention were discussed with the patient. Risks including but not limited to bleeding, infection, contrast allergy reaction, acute kidney injury, MI, stroke, emergent CABG and death were discussed. The patient understands the risks of the procedure and wishes to proceed. CV- RRR without murmur  Lungs- Clear bilaterally  Abd- soft, nontender, nondistended  Ext- no edema    Plan: As above.     Jeffrey Clark MD  [address] Cardiology  Pager [phone]

## 2017-03-30 NOTE — PROGRESS NOTES
Discharge instructions reviewed with Patient. Prescriptions given for Protonix and med info sheets provided for all new medications. Opportunity for questions provided. Patient voiced understanding of all discharge instructions.

## 2017-03-30 NOTE — DISCHARGE INSTRUCTIONS
Learning About Percutaneous Coronary Intervention  What is percutaneous coronary intervention? Percutaneous coronary intervention (PCI) is the name for procedures to open a blocked coronary artery. The two most common are coronary angioplasty and coronary stent placement. A PCI is a way to open a blocked coronary artery before, during, or after a heart attack. It gets blood flowing to your heart. And it can help prevent heart problems by widening an artery that has been narrowed by fatty buildup (plaque). This also may be called balloon angioplasty. Before a PCI, a doctor does a test to find blocked arteries. The test is called cardiac catheterization. The doctor puts a tiny tube called a catheter into an artery in your groin or arm. The doctor moves the catheter through the artery to your heart. Then he or she puts a dye into the catheter. This makes your heart's arteries show up on a screen so the doctor can see any blockages. The test also can measure the pressure inside your heart's chambers. If you have a blocked artery, the doctor may do an angioplasty or a coronary stent procedure. In an angioplasty, the doctor uses a catheter with a tiny balloon at the tip. He or she puts it into the blocked area and inflates it. The balloon presses the plaque against the walls of the artery. This makes more room for blood to flow. In most cases, the doctor then puts a stent in the artery. A stent is a small, wire-mesh tube that presses against the walls of the artery. The stent is left in the artery to keep the artery open. This helps blood flow. It also may keep small pieces of plaque from breaking off and causing a heart attack. How is PCI done? PCI is done in a cardiac catheterization laboratory (\"cath lab\"). It is done by a heart specialist called a cardiologist. The whole procedure may take 1½ to 3 hours. You lie on a table under a large X-ray machine.  You will get medicine through an IV in one of your veins. It helps you relax and not feel pain. You will be awake during the procedure. But you may not be able to remember much about it. The doctor will inject some medicine into your arm or groin to numb the skin. You will feel a small needle stick. It's like having a blood test. You may feel some pressure when the doctor puts in the catheter. But you will not feel pain. The doctor will look at X-ray pictures on a monitor (like a TV set) to move the catheter to your heart. You may feel warm or flushed for a short time when the doctor injects dye into your artery. The doctor then will inflate a tiny balloon at the end of the catheter. The balloon is inflated for a brief time. Then it is deflated and removed. The doctor also may use the catheter to put a stent in the artery. What can you expect after PCI? The catheter will be removed. A nurse or doctor may press on a bandage on the opening. This prevents bleeding. After the test, you will be taken to a room where the catheter site and your heart rate, blood pressure, and temperature will be checked several times. If the catheter was put in your groin, you will need to lie still and keep your leg straight for several hours. If the catheter was put in your arm, you may be able to sit up and get out of bed right away. But you will need to keep your arm still for at least one hour. The average hospital stay is 1 to 2 days for most procedures. When you go home, you will get instructions from your doctor to help you recover well and prevent problems. Make sure to drink plenty of fluids (unless your doctor tells you not to) for several hours after the test. This will help flush the dye out of your body. Your doctor will prescribe blood-thinning medicines. You will likely take aspirin plus another blood thinner. It is very important that you take these medicines exactly as directed. They help keep the coronary artery open and reduce your risk of a heart attack.   If you have this procedure, you will still need to make lifestyle changes like eating healthy, being active, and not smoking. This will give you the best chance for a longer, healthier life. Follow-up care is a key part of your treatment and safety. Be sure to make and go to all appointments, and call your doctor if you are having problems. It's also a good idea to know your test results and keep a list of the medicines you take. Where can you learn more? Go to http://paulo-sherman.info/. Enter E297 in the search box to learn more about \"Learning About Percutaneous Coronary Intervention. \"  Current as of: March 22, 2016  Content Version: 11.2  © 3968-8943 [employer]. Care instructions adapted under license by [employer] (which disclaims liability or warranty for this information). If you have questions about a medical condition or this instruction, always ask your healthcare professional. [place] [address] any warranty or liability for your use of this information. High Blood Pressure: Care Instructions  Your Care Instructions  If your blood pressure is usually above 140/90, you have high blood pressure, or hypertension. That means the top number is 140 or higher or the bottom number is 90 or higher, or both. Despite what a lot of people think, high blood pressure usually doesn't cause headaches or make you feel dizzy or lightheaded. It usually has no symptoms. But it does increase your risk for heart attack, stroke, and kidney or eye damage. The higher your blood pressure, the more your risk increases. Your doctor will give you a goal for your blood pressure. Your goal will be based on your health and your age. An example of a goal is to keep your blood pressure below 140/90. Lifestyle changes, such as eating healthy and being active, are always important to help lower blood pressure.  You might also take medicine to reach your blood pressure goal.  Follow-up care is a key part of your treatment and safety. Be sure to make and go to all appointments, and call your doctor if you are having problems. It's also a good idea to know your test results and keep a list of the medicines you take. How can you care for yourself at home? Medical treatment  · If you stop taking your medicine, your blood pressure will go back up. You may take one or more types of medicine to lower your blood pressure. Be safe with medicines. Take your medicine exactly as prescribed. Call your doctor if you think you are having a problem with your medicine. · Talk to your doctor before you start taking aspirin every day. Aspirin can help certain people lower their risk of a heart attack or stroke. But taking aspirin isn't right for everyone, because it can cause serious bleeding. · See your doctor regularly. You may need to see the doctor more often at first or until your blood pressure comes down. · If you are taking blood pressure medicine, talk to your doctor before you take decongestants or anti-inflammatory medicine, such as ibuprofen. Some of these medicines can raise blood pressure. · Learn how to check your blood pressure at home. Lifestyle changes  · Stay at a healthy weight. This is especially important if you put on weight around the waist. Losing even 10 pounds can help you lower your blood pressure. · If your doctor recommends it, get more exercise. Walking is a good choice. Bit by bit, increase the amount you walk every day. Try for at least 30 minutes on most days of the week. You also may want to swim, bike, or do other activities. · Avoid or limit alcohol. Talk to your doctor about whether you can drink any alcohol. · Try to limit how much sodium you eat to less than 2,300 milligrams (mg) a day. Your doctor may ask you to try to eat less than 1,500 mg a day.   · Eat plenty of fruits (such as bananas and oranges), vegetables, legumes, whole grains, and low-fat dairy products. · Lower the amount of saturated fat in your diet. Saturated fat is found in animal products such as milk, cheese, and meat. Limiting these foods may help you lose weight and also lower your risk for heart disease. · Do not smoke. Smoking increases your risk for heart attack and stroke. If you need help quitting, talk to your doctor about stop-smoking programs and medicines. These can increase your chances of quitting for good. When should you call for help? Call 911 anytime you think you may need emergency care. This may mean having symptoms that suggest that your blood pressure is causing a serious heart or blood vessel problem. Your blood pressure may be over 180/110. For example, call 911 if:  · You have symptoms of a heart attack. These may include:  ¨ Chest pain or pressure, or a strange feeling in the chest.  ¨ Sweating. ¨ Shortness of breath. ¨ Nausea or vomiting. ¨ Pain, pressure, or a strange feeling in the back, neck, jaw, or upper belly or in one or both shoulders or arms. ¨ Lightheadedness or sudden weakness. ¨ A fast or irregular heartbeat. · You have symptoms of a stroke. These may include:  ¨ Sudden numbness, tingling, weakness, or loss of movement in your face, arm, or leg, especially on only one side of your body. ¨ Sudden vision changes. ¨ Sudden trouble speaking. ¨ Sudden confusion or trouble understanding simple statements. ¨ Sudden problems with walking or balance. ¨ A sudden, severe headache that is different from past headaches. · You have severe back or belly pain. Do not wait until your blood pressure comes down on its own. Get help right away. Call your doctor now or seek immediate care if:  · Your blood pressure is much higher than normal (such as 180/110 or higher), but you don't have symptoms. · You think high blood pressure is causing symptoms, such as:  ¨ Severe headache. ¨ Blurry vision.   Watch closely for changes in your health, and be sure to contact your doctor if:  · Your blood pressure measures 140/90 or higher at least 2 times. That means the top number is 140 or higher or the bottom number is 90 or higher, or both. · You think you may be having side effects from your blood pressure medicine. · Your blood pressure is usually normal, but it goes above normal at least 2 times. Where can you learn more? Go to http://paulo-sherman.info/. Enter L542 in the search box to learn more about \"High Blood Pressure: Care Instructions. \"  Current as of: August 8, 2016  Content Version: 11.2  © 3041-9633 [employer]. Care instructions adapted under license by [employer] (which disclaims liability or warranty for this information). If you have questions about a medical condition or this instruction, always ask your healthcare professional. [address] any warranty or liability for your use of this information. Angina: Care Instructions  Your Care Instructions    You have a problem called angina. Angina happens when there is not enough blood flow to your heart muscle. Angina is a sign of coronary artery disease (CAD). CAD occurs when blood vessels that supply the heart become narrowed. Having CAD increases your risk of a heart attack. Chest pain or pressure is the most common symptom of angina. But some people have other symptoms, like:  · Pain, pressure, or a strange feeling in the back, neck, jaw, or upper belly, or in one or both shoulders or arms. · Shortness of breath. · Nausea or vomiting. · Lightheadedness or sudden weakness. · Fast or irregular heartbeat. Women are somewhat more likely than men to have angina symptoms like shortness of breath, nausea, and back or jaw pain. Angina can be dangerous. That's why it is important to pay attention to your symptoms. Know what is typical for you, learn how to control your symptoms, and understand when you need to get treatment.   A change in your usual pattern of symptoms is an emergency. It may mean that you are having a heart attack. The doctor has checked you carefully, but problems can develop later. If you notice any problems or new symptoms, get medical treatment right away. Follow-up care is a key part of your treatment and safety. Be sure to make and go to all appointments, and call your doctor if you are having problems. It's also a good idea to know your test results and keep a list of the medicines you take. How can you care for yourself at home? Medicines  · If your doctor has given you nitroglycerin for angina symptoms, keep it with you at all times. If you have symptoms, sit down and rest, and take the first dose of nitroglycerin as directed. If your symptoms get worse or are not getting better within 5 minutes, call 911 right away. Stay on the phone. The emergency  will give you further instructions. · If your doctor advises it, take 1 low-dose aspirin a day to prevent heart attack. · Be safe with medicines. Take your medicines exactly as prescribed. Call your doctor if you think you are having a problem with your medicine. You will get more details on the specific medicines your doctor prescribes. Lifestyle changes  · Do not smoke. If you need help quitting, talk to your doctor about stop-smoking programs and medicines. These can increase your chances of quitting for good. · Eat a heart-healthy diet that is low in saturated fat and salt, and is high in fiber. Talk to your doctor or a dietitian about healthy eating. · Stay at a healthy weight. Or lose weight if you need to. Activity  · Talk to your doctor about a level of activity that is safe for you. · If an activity causes angina symptoms, stop and rest.  When should you call for help? Call 911 anytime you think you may need emergency care. For example, call if:  · You passed out (lost consciousness). · You have symptoms of a heart attack.  These may include:  ¨ Chest pain or pressure, or a strange feeling in the chest.  ¨ Sweating. ¨ Shortness of breath. ¨ Nausea or vomiting. ¨ Pain, pressure, or a strange feeling in the back, neck, jaw, or upper belly or in one or both shoulders or arms. ¨ Lightheadedness or sudden weakness. ¨ A fast or irregular heartbeat. After you call 911, the  may tell you to chew 1 adult-strength or 2 to 4 low-dose aspirin. Wait for an ambulance. Do not try to drive yourself. · You have angina symptoms that do not go away with rest or are not getting better within 5 minutes after you take a dose of nitroglycerin. Call your doctor now or seek immediate medical care if:  · You are having angina symptoms more often than usual, or they are different or worse than usual.  · You feel dizzy or lightheaded, or you feel like you may faint. Watch closely for changes in your health, and be sure to contact your doctor if you have any problems. Where can you learn more? Go to http://paulo-sherman.info/. Enter H129 in the search box to learn more about \"Angina: Care Instructions. \"  Current as of: January 27, 2016  Content Version: 11.2  © 8085-8910 [employer]. Care instructions adapted under license by [employer] (which disclaims liability or warranty for this information). If you have questions about a medical condition or this instruction, always ask your healthcare professional. [place] [address] any warranty or liability for your use of this information. DISCHARGE SUMMARY from Nurse    The following personal items are in your possession at time of discharge:    Dental Appliances: None  Visual Aid: At bedside, Glasses     Home Medications: None  Jewelry: Bracelet  Clothing:  At bedside, Footwear, Pajamas, Pants, Shirt, Socks, Undergarments, With patient  Other Valuables: Cell Phone, Kathy Peterson  Personal Items Sent to Safe: pt declines          PATIENT INSTRUCTIONS:    After general anesthesia or intravenous sedation, for 24 hours or while taking prescription Narcotics:  · Limit your activities  · Do not drive and operate hazardous machinery  · Do not make important personal or business decisions  · Do  not drink alcoholic beverages  · If you have not urinated within 8 hours after discharge, please contact your surgeon on call. Report the following to your surgeon:  · Excessive pain, swelling, redness or odor of or around the surgical area  · Temperature over 100.5  · Nausea and vomiting lasting longer than 4 hours or if unable to take medications  · Any signs of decreased circulation or nerve impairment to extremity: change in color, persistent  numbness, tingling, coldness or increase pain  · Any questions        What to do at Home:  Recommended activity: Activity as tolerated and Activity as tolerated and no driving for today  The patient is being discharged home in stable condition on a low saturated fat, low cholesterol and low salt diet. The patient is instructed to advance activities as tolerated to the limit of fatigue or shortness of breath. The patient is instructed to avoid all heavy lifting for 3-5 days. The patient is instructed to watch the cath site for bleeding/oozing; if seen, the patient is instructed to apply firm pressure with a clean cloth and call [facility] at [phone]. The patient is instructed to watch for signs of infection which include: increasing area of redness, fever/hot to touch or purulent drainage at the catheterization site. The patient is instructed not to soak in a bathtub for 7-10 days, but is cleared to shower. The patient is instructed to call the office or return to the ER for immediate evaluation for any severe shortness of breath or chest pain. *  Please give a list of your current medications to your Primary Care Provider.     *  Please update this list whenever your medications are discontinued, doses are      changed, or new medications (including over-the-counter products) are added. *  Please carry medication information at all times in case of emergency situations. These are general instructions for a healthy lifestyle:    No smoking/ No tobacco products/ Avoid exposure to second hand smoke    Surgeon General's Warning:  Quitting smoking now greatly reduces serious risk to your health. Obesity, smoking, and sedentary lifestyle greatly increases your risk for illness    A healthy diet, regular physical exercise & weight monitoring are important for maintaining a healthy lifestyle    You may be retaining fluid if you have a history of heart failure or if you experience any of the following symptoms:  Weight gain of 3 pounds or more overnight or 5 pounds in a week, increased swelling in our hands or feet or shortness of breath while lying flat in bed. Please call your doctor as soon as you notice any of these symptoms; do not wait until your next office visit. Recognize signs and symptoms of STROKE:    F-face looks uneven    A-arms unable to move or move unevenly    S-speech slurred or non-existent    T-time-call 911 as soon as signs and symptoms begin-DO NOT go       Back to bed or wait to see if you get better-TIME IS BRAIN. Warning Signs of HEART ATTACK     Call 911 if you have these symptoms:   Chest discomfort. Most heart attacks involve discomfort in the center of the chest that lasts more than a few minutes, or that goes away and comes back. It can feel like uncomfortable pressure, squeezing, fullness, or pain.  Discomfort in other areas of the upper body. Symptoms can include pain or discomfort in one or both arms, the back, neck, jaw, or stomach.  Shortness of breath with or without chest discomfort.  Other signs may include breaking out in a cold sweat, nausea, or lightheadedness. Don't wait more than five minutes to call 911 - MINUTES MATTER!  Fast action can save your life. Calling 911 is almost always the fastest way to get lifesaving treatment. Emergency Medical Services staff can begin treatment when they arrive -- up to an hour sooner than if someone gets to the hospital by car. The discharge information has been reviewed with the patient. The patient verbalized understanding. Discharge medications reviewed with the patient and appropriate educational materials and side effects teaching were provided. Cardiac Catheterization/Angiography Discharge Instructions    *Check the puncture site frequently for swelling or bleeding. If you see any bleeding, lie down and apply pressure over the area with a clean town or washcloth. Notify your doctor for any redness, swelling, drainage or oozing from the puncture site. Notify your doctor for any fever or chills. *If the leg or arm with the puncture becomes cold, numb or painful, call [address] Cardiology at [phone]. *Activity should be limited for the next 48 hours. Climb stairs as little as possible and avoid any stooping, bending or strenuous activity for 48 hours. No heavy lifting (anything over 10 pounds) for three days. *Do not drive for 48 hours. *You may resume your usual diet. Drink more fluids than usual.    *Have a responsible person drive you home and stay with you for at least 24 hours after your heart catheterization/angiography. *You may remove the bandage from your right wrist in 24 hours. You may shower in 24 hours. No tub baths, hot tubs or swimming for one week. Do not place any lotions, creams, powders, ointments over the puncture site for one week. You may place a clean band-aid over the puncture site each day for 5 days. Change this daily. Heart-Healthy Diet: Care Instructions  Your Care Instructions    A heart-healthy diet has lots of vegetables, fruits, nuts, beans, and whole grains, and is low in salt.  It limits foods that are high in saturated fat, such as meats, cheeses, and fried foods. It may be hard to change your diet, but even small changes can lower your risk of heart attack and heart disease. Follow-up care is a key part of your treatment and safety. Be sure to make and go to all appointments, and call your doctor if you are having problems. It's also a good idea to know your test results and keep a list of the medicines you take. How can you care for yourself at home? Watch your portions  · Learn what a serving is. A \"serving\" and a \"portion\" are not always the same thing. Make sure that you are not eating larger portions than are recommended. For example, a serving of pasta is ½ cup. A serving size of meat is 2 to 3 ounces. A 3-ounce serving is about the size of a deck of cards. Measure serving sizes until you are good at [place]" them. Keep in mind that restaurants often serve portions that are 2 or 3 times the size of one serving. · To keep your energy level up and keep you from feeling hungry, eat often but in smaller portions. · Eat only the number of calories you need to stay at a healthy weight. If you need to lose weight, eat fewer calories than your body burns (through exercise and other physical activity). Eat more fruits and vegetables  · Eat a variety of fruit and vegetables every day. Dark green, deep orange, red, or yellow fruits and vegetables are especially good for you. Examples include spinach, carrots, peaches, and berries. · Keep carrots, celery, and other veggies handy for snacks. Buy fruit that is in season and store it where you can see it so that you will be tempted to eat it. · Cook dishes that have a lot of veggies in them, such as stir-fries and soups. Limit saturated and trans fat  · Read food labels, and try to avoid saturated and trans fats. They increase your risk of heart disease. Trans fat is found in many processed foods such as cookies and crackers. · Use olive or canola oil when you cook.  Try cholesterol-lowering spreads, such as Benecol or Take Control. · Bake, broil, grill, or steam foods instead of frying them. · Choose lean meats instead of high-fat meats such as hot dogs and sausages. Cut off all visible fat when you prepare meat. · Eat fish, skinless poultry, and meat alternatives such as soy products instead of high-fat meats. Soy products, such as tofu, may be especially good for your heart. · Choose low-fat or fat-free milk and dairy products. Eat fish  · Eat at least two servings of fish a week. Certain fish, such as salmon and tuna, contain omega-3 fatty acids, which may help reduce your risk of heart attack. Eat foods high in fiber  · Eat a variety of grain products every day. Include whole-grain foods that have lots of fiber and nutrients. Examples of whole-grain foods include oats, whole wheat bread, and brown rice. · Buy whole-grain breads and cereals, instead of white bread or pastries. Limit salt and sodium  · Limit how much salt and sodium you eat to help lower your blood pressure. · Taste food before you salt it. Add only a little salt when you think you need it. With time, your taste buds will adjust to less salt. · Eat fewer snack items, fast foods, and other high-salt, processed foods. Check food labels for the amount of sodium in packaged foods. · Choose low-sodium versions of canned goods (such as soups, vegetables, and beans). Limit sugar  · Limit drinks and foods with added sugar. These include candy, desserts, and soda pop. Limit alcohol  · Limit alcohol to no more than 2 drinks a day for men and 1 drink a day for women. Too much alcohol can cause health problems. When should you call for help? Watch closely for changes in your health, and be sure to contact your doctor if:  · You would like help planning heart-healthy meals. Where can you learn more? Go to http://paulo-sherman.info/.   Enter V137 in the search box to learn more about \"Heart-Healthy Diet: Care Instructions. \"  Current as of: January 27, 2016  Content Version: 11.2  © 1497-3004 [employer]. Care instructions adapted under license by [employer] (which disclaims liability or warranty for this information). If you have questions about a medical condition or this instruction, always ask your healthcare professional. [address] any warranty or liability for your use of this information.

## 2017-03-30 NOTE — PROGRESS NOTES
TRANSFER - OUT REPORT:    Verbal report given to marni rn(name) on Clovis Joshua  being transferred to tele(unit) for routine progression of care       Report consisted of patients Situation, Background, Assessment and   Recommendations(SBAR). Information from the following report(s) SBAR was reviewed with the receiving nurse. Lines:   Peripheral IV 03/29/17 Left Antecubital (Active)   Site Assessment Clean, dry, & intact 3/30/2017  7:15 AM   Phlebitis Assessment 0 3/30/2017  7:15 AM   Infiltration Assessment 0 3/30/2017  7:15 AM   Dressing Status Clean, dry, & intact 3/30/2017  7:15 AM   Dressing Type Tape;Transparent 3/30/2017  7:15 AM   Hub Color/Line Status Infusing 3/30/2017  7:15 AM        Opportunity for questions and clarification was provided.       Patient transported with:   Kurt Laguerre  Normal coronaries  Right wrist r band 12ml  [id number]  Fentanyl 25mcg

## 2017-03-30 NOTE — PROCEDURES
Brief Cardiac Procedure Note    Patient: Sarah Hernandez MRN: [id number]  SSN: [id number]    YOB: 1970  Age: 40 y.o. Sex: male      Date of Procedure: 3/30/2017     Pre-procedure Diagnosis: Chest pain CCS Class III    Post-procedure Diagnosis: Coronary Artery Disease    Procedure: Left Heart Catheterization    Brief Description of Procedure: LHC    Performed By: Jamison Martin MD     Assistants: NONE    Anesthesia: Moderate Sedation    Estimated Blood Loss: Less than 10 mL      Specimens: None    Implants: None    Findings:   LV NORMAL  CORS MINIMAL IRREGS  RIGHT RADIAL    Complications: None    Recommendations: Continue medical therapy.   HOME LATER TODAY ON DAILY PROTONIX, FOLLOW UP WITH DR. Alycia Gupta IN 2 WEEKS    Signed By: Jamison Martin MD     March 30, 2017

## 2017-03-30 NOTE — PROGRESS NOTES
TRANSFER - IN REPORT:    Verbal report received from [facility], [facility] on [address]  being received from cath lab for routine progression of care      Report consisted of patients Situation, Background, Assessment and   Recommendations(SBAR). Information from the following report(s) Procedure Summary was reviewed with the receiving nurse. Opportunity for questions and clarification was provided. Assessment completed upon patients arrival to unit and care assumed. Patient to floor from cath lab. Patient placed on monitor and eagle for frequent vital signs. Right radial site with R band intact without bleeding or hematoma. Pulses palpable. Patient instructed to limit use of right hand/wrist; patient verbalized understanding. Will continue to monitor.

## 2017-03-30 NOTE — DISCHARGE SUMMARY
[facility] Cardiology Discharge Summary     Patient ID:  Jos Joel  [id number]  59 y.o.  1970    Admit date: 3/29/2017    Discharge date:  3/30/2017    Admitting Physician: Tammy Coronado MD     Discharge Physician: ARNEL Centeno/Dr. Benji Lantigua    Admission Diagnoses: chest pain  Chest pain    Discharge Diagnoses:   Patient Active Problem List    Diagnosis Date Noted    Unstable angina ([address] 03/29/2017    Shortness of breath 03/29/2017    GERD (gastroesophageal reflux disease) 02/16/2016    Chronic pain of left ankle 09/07/2015    Insomnia, unspecified 07/06/2015    Anxiety state, unspecified     Foot drop, left     Hypercholesterolemia     Hypertension     Tobacco use disorder     Chronic radicular low back pain     Thoracic or lumbosacral neuritis or radiculitis, unspecified     Impotence of organic origin     Prediabetes 04/01/2013       Cardiology Procedures this admission:  Diagnostic left heart catheterization  Consults: None    Hospital Course: Patient presented to the emergency department of [facility] with complaints of 2 week history of exertional chest pain and dyspnea. Chest pain was described as pressure. Symptoms had progressed and were occurring with little activity. Initial troponin was negative. He was admitted for further cardiac evaluation and treatment. Cardiac enzymes were followed and remained negative. [facility] was planned for 3-30-17. Patient underwent cardiac catheterization by Dr. Benji Lantigua. Patient was found to have minimal irregularities. He had GEOVANNY 2 flow in the RCA and LAD consistent with endothelial dysfunction. Patient tolerated the procedure well and returned to the telemetry floor for recovery. He was feeling well following the procedure and was determined stable and ready for discharge. Patient was instructed on the importance of medication compliance and outpatient follow up. He is discharged on trial of Protonix.   He is instructed to follow up with PCP Dr. Alla Balbuena 2 weeks. He will follow up with [facility] as needed. DISPOSITION: The patient is being discharged home in stable condition on a low saturated fat, low cholesterol and low salt diet. The patient is instructed to advance activities as tolerated to the limit of fatigue or shortness of breath. The patient is instructed to avoid all heavy lifting for 3-5 days. The patient is instructed to watch the cath site for bleeding/oozing; if seen, the patient is instructed to apply firm pressure with a clean cloth and call [facility] at [phone]. The patient is instructed to watch for signs of infection which include: increasing area of redness, fever/hot to touch or purulent drainage at the catheterization site. The patient is instructed not to soak in a bathtub for 7-10 days, but is cleared to shower. The patient is instructed to call the office or return to the ER for immediate evaluation for any severe shortness of breath or chest pain.     Discharge Exam:   Visit Vitals    /77    Pulse (!) 54    Temp 96.8 °F (36 °C)    Resp 18    Ht 5' 7\" (1.702 m)    Wt 88 kg (194 lb)    SpO2 95%    BMI 30.38 kg/m2      Physical Exam:  General: Well Developed, Well Nourished, No Acute Distress, Alert & Oriented  Neck: supple, no JVD  Heart: S1S2 with RRR  Lungs: Clear throughout auscultation bilaterally  Abd: soft, nontender, nondistended, with good bowel sounds  Ext: warm, no edema, calves supple/nontender, pulses 2+ bilaterally  Skin: warm and dry      Recent Results (from the past 24 hour(s))   TROPONIN I    Collection Time: 03/29/17  9:20 PM   Result Value Ref Range    Troponin-I, Qt. 0.04 0.02 - 0.05 NG/ML   LIPID PANEL    Collection Time: 03/30/17  7:52 AM   Result Value Ref Range    LIPID PROFILE          Cholesterol, total 190 <200 MG/DL    Triglyceride 104 35 - 150 MG/DL    HDL Cholesterol 40 40 - 60 MG/DL    LDL, calculated 129.2 (H) <100 MG/DL    VLDL, calculated 20.8 6.0 - 23.0 MG/DL    CHOL/HDL Ratio 4.8     TROPONIN I    Collection Time: 03/30/17  7:52 AM   Result Value Ref Range    Troponin-I, Qt. 0.03 0.02 - 0.39 NG/ML   METABOLIC PANEL, BASIC    Collection Time: 03/30/17  7:52 AM   Result Value Ref Range    Sodium 138 136 - 145 mmol/L    Potassium 4.4 3.5 - 5.1 mmol/L    Chloride 105 98 - 107 mmol/L    CO2 26 21 - 32 mmol/L    Anion gap 7 7 - 16 mmol/L    Glucose 98 65 - 100 mg/dL    BUN 9 6 - 23 MG/DL    Creatinine 1.36 0.8 - 1.5 MG/DL    GFR est AA >60 >60 ml/min/1.73m2    GFR est non-AA >60 >60 ml/min/1.73m2    Calcium 9.2 8.3 - 10.4 MG/DL         Patient Instructions:   Current Discharge Medication List      START taking these medications    Details   pantoprazole (PROTONIX) 40 mg tablet Take 1 Tab by mouth daily. Qty: 30 Tab, Refills: 6         CONTINUE these medications which have NOT CHANGED    Details   amLODIPine (NORVASC) 10 mg tablet Take 1 Tab by mouth daily. Qty: 30 Tab, Refills: 5    Associated Diagnoses: Essential hypertension with goal blood pressure less than 140/90      metFORMIN ER (GLUCOPHAGE XR) 750 mg tablet Take 1 Tab by mouth daily. Take with food. Qty: 30 Tab, Refills: 5    Associated Diagnoses: Prediabetes      tadalafil (CIALIS) 20 mg tablet Take 1 Tab by mouth as needed.   Qty: 12 Tab, Refills: 1    Associated Diagnoses: Impotence due to erectile dysfunction               Signed:  Joan Christina PA-C  3/30/2017  1:25 PM

## 2017-04-01 PROBLEM — Z92.89 H/O DIAGNOSTIC TESTS: Status: ACTIVE | Noted: 2017-04-01

## 2017-09-13 PROBLEM — M54.41 CHRONIC BILATERAL LOW BACK PAIN WITH BILATERAL SCIATICA: Status: ACTIVE | Noted: 2017-09-13

## 2017-09-13 PROBLEM — G89.29 CHRONIC BILATERAL LOW BACK PAIN WITH BILATERAL SCIATICA: Status: ACTIVE | Noted: 2017-09-13

## 2017-09-13 PROBLEM — M54.42 CHRONIC BILATERAL LOW BACK PAIN WITH BILATERAL SCIATICA: Status: ACTIVE | Noted: 2017-09-13

## 2018-05-11 PROBLEM — F41.9 ANXIETY: Status: ACTIVE | Noted: 2018-05-11

## 2018-05-11 PROBLEM — M75.41 SHOULDER IMPINGEMENT SYNDROME, RIGHT: Status: ACTIVE | Noted: 2018-05-11

## 2018-08-29 PROBLEM — R07.89 OTHER CHEST PAIN: Status: ACTIVE | Noted: 2018-08-29

## 2021-03-18 PROBLEM — Z12.11 ENCOUNTER FOR SCREENING COLONOSCOPY: Status: ACTIVE | Noted: 2021-03-18

## 2021-04-17 PROBLEM — Z12.11 ENCOUNTER FOR SCREENING COLONOSCOPY: Status: RESOLVED | Noted: 2021-03-18 | Resolved: 2021-04-17

## 2021-06-16 ENCOUNTER — APPOINTMENT (OUTPATIENT)
Dept: GENERAL RADIOLOGY | Age: 51
End: 2021-06-16
Attending: ANESTHESIOLOGY

## 2021-06-16 ENCOUNTER — HOSPITAL ENCOUNTER (OUTPATIENT)
Age: 51
Setting detail: OUTPATIENT SURGERY
Discharge: HOME | End: 2021-06-16
Attending: SURGERY | Admitting: SURGERY
Payer: COMMERCIAL

## 2021-06-16 ENCOUNTER — ANESTHESIA EVENT (OUTPATIENT)
Dept: ENDOSCOPY | Age: 51
End: 2021-06-16

## 2021-06-16 ENCOUNTER — ANESTHESIA (OUTPATIENT)
Dept: ENDOSCOPY | Age: 51
End: 2021-06-16

## 2021-06-16 VITALS
HEIGHT: 68 IN | BODY MASS INDEX: 29.55 KG/M2 | RESPIRATION RATE: 18 BRPM | DIASTOLIC BLOOD PRESSURE: 90 MMHG | HEART RATE: 65 BPM | TEMPERATURE: 96.8 F | WEIGHT: 195 LBS | OXYGEN SATURATION: 99 % | SYSTOLIC BLOOD PRESSURE: 119 MMHG

## 2021-06-16 DIAGNOSIS — Z12.11 ENCOUNTER FOR SCREENING COLONOSCOPY: ICD-10-CM

## 2021-06-16 LAB
GLUCOSE BLD STRIP.AUTO-MCNC: 119 MG/DL (ref 65–100)
SERVICE CMNT-IMP: ABNORMAL

## 2021-06-16 PROCEDURE — 76040000026: Performed by: SURGERY

## 2021-06-16 PROCEDURE — 74011250636 HC RX REV CODE- 250/636: Performed by: REGISTERED NURSE

## 2021-06-16 PROCEDURE — 82962 GLUCOSE BLOOD TEST: CPT

## 2021-06-16 PROCEDURE — 74011250636 HC RX REV CODE- 250/636: Performed by: ANESTHESIOLOGY

## 2021-06-16 PROCEDURE — 74011000250 HC RX REV CODE- 250: Performed by: REGISTERED NURSE

## 2021-06-16 PROCEDURE — 45378 DIAGNOSTIC COLONOSCOPY: CPT | Performed by: SURGERY

## 2021-06-16 PROCEDURE — 71045 X-RAY EXAM CHEST 1 VIEW: CPT

## 2021-06-16 PROCEDURE — 76060000033 HC ANESTHESIA 1 TO 1.5 HR: Performed by: SURGERY

## 2021-06-16 PROCEDURE — 2709999900 HC NON-CHARGEABLE SUPPLY: Performed by: SURGERY

## 2021-06-16 RX ORDER — LIDOCAINE HYDROCHLORIDE 20 MG/ML
INJECTION, SOLUTION EPIDURAL; INFILTRATION; INTRACAUDAL; PERINEURAL AS NEEDED
Status: DISCONTINUED | OUTPATIENT
Start: 2021-06-16 | End: 2021-06-16 | Stop reason: HOSPADM

## 2021-06-16 RX ORDER — SODIUM CHLORIDE, SODIUM LACTATE, POTASSIUM CHLORIDE, CALCIUM CHLORIDE 600; 310; 30; 20 MG/100ML; MG/100ML; MG/100ML; MG/100ML
1000 INJECTION, SOLUTION INTRAVENOUS CONTINUOUS
Status: DISCONTINUED | OUTPATIENT
Start: 2021-06-16 | End: 2021-06-16 | Stop reason: HOSPADM

## 2021-06-16 RX ORDER — PROPOFOL 10 MG/ML
INJECTION, EMULSION INTRAVENOUS
Status: DISCONTINUED | OUTPATIENT
Start: 2021-06-16 | End: 2021-06-16 | Stop reason: HOSPADM

## 2021-06-16 RX ORDER — PROPOFOL 10 MG/ML
INJECTION, EMULSION INTRAVENOUS AS NEEDED
Status: DISCONTINUED | OUTPATIENT
Start: 2021-06-16 | End: 2021-06-16 | Stop reason: HOSPADM

## 2021-06-16 RX ADMIN — LIDOCAINE HYDROCHLORIDE 40 MG: 20 INJECTION, SOLUTION EPIDURAL; INFILTRATION; INTRACAUDAL; PERINEURAL at 09:25

## 2021-06-16 RX ADMIN — PROPOFOL 70 MG: 10 INJECTION, EMULSION INTRAVENOUS at 09:25

## 2021-06-16 RX ADMIN — PROPOFOL 200 MCG/KG/MIN: 10 INJECTION, EMULSION INTRAVENOUS at 09:25

## 2021-06-16 RX ADMIN — SODIUM CHLORIDE, SODIUM LACTATE, POTASSIUM CHLORIDE, AND CALCIUM CHLORIDE 1000 ML: 600; 310; 30; 20 INJECTION, SOLUTION INTRAVENOUS at 09:11

## 2021-06-16 NOTE — H&P
HPI  Clovis Camarena is a 53 y.o. male Patient presents today for a colonoscopy. Patient denies melena, denies hematochezia, denies abdominal or rectal pain. Patient states bowel habits are good, denies diarrhea or constipation. Good appetite, no unintentional weight loss noted. Denies N/V. Denies CP or SOB.      Does not  have a family history of colon cancer. Does not take blood thinners                Past Medical History:   Diagnosis Date    Anxiety state, unspecified      Back pain      Chronic pain       back pain radiating down his LLE    Depressive disorder, not elsewhere classified      Diabetes ([facility] [address] dx 2013     Type 2, oral med, unsure last HA1C, never hypo, does not use glucometer    Dysmetabolic syndrome X 3/9/44     HgbA1C 6.4    Foot drop, left      GERD (gastroesophageal reflux disease)       occ. episode, relieved with tums    Hypercholesterolemia        on 4/1/13    Hypertension       controlled with med    Impotence of organic origin      Insomnia, unspecified 7/6/2015    Thoracic or lumbosacral neuritis or radiculitis, unspecified      Tobacco use disorder               Current Outpatient Medications   Medication Sig Dispense Refill    gabapentin (NEURONTIN) 100 mg capsule Take 1 cap in the morning and 2 caps at night for 1-2 weeks. May increase to 1-2 caps in the morning and 2-3 caps at night. 150 Cap 1    atorvastatin (LIPITOR) 40 mg tablet Take 1 Tab by mouth daily. 90 Tab 1    amLODIPine (NORVASC) 10 mg tablet Take 1 Tab by mouth daily. 90 Tab 1    metFORMIN (GLUCOPHAGE) 500 mg tablet Take 1 Tab by mouth two (2) times daily (with meals). 180 Tab 1    baclofen (LIORESAL) 10 mg tablet Take 1 Tab by mouth three (3) times daily as needed (back spasm). 90 Tab 1    ibuprofen (MOTRIN) 800 mg tablet Take 1 Tab by mouth every eight (8) hours as needed for Pain. 30 Tab 5    pantoprazole (PROTONIX) 40 mg tablet Take 1 Tab by mouth daily.  Indications: inflammation of the stomach lining caused by H. pylori 90 Tab 1    tadalafiL (CIALIS) 10 mg tablet Take 1 Tab by mouth as needed (erectile dysfunction). 80 Tab 1    DISABLED PLACARD (DISABLED PLACARD) DMV Dx: M54.42 1 Each 0    GINGER ROOT, BULK, by Does Not Apply route.        TURMERIC, BULK, by Does Not Apply route.          No Known Allergies        Past Surgical History:   Procedure Laterality Date    HX ORTHOPAEDIC Left 2012     ORIF Left Ankle by Dr. Rocky Javier Left 13     Tendon repair to treat foot drop in left ankle by Dr. Freeman Beckett         herniated disc, discectomy by Dr Bari Casanova     AK BREAST SURGERY PROCEDURE UNLISTED Left       cyst removed            Family History   Problem Relation Age of Onset    Cancer Mother           breast    Breast Cancer Mother      Hypertension Mother      Diabetes Father      Hypertension Brother      Gout Brother      SLE Son 23      Social History            Tobacco Use    Smoking status: Current Some Day Smoker       Packs/day: 0.25       Years: 20.00       Pack years: 5.00       Last attempt to quit:        Years since quittin.2    Smokeless tobacco: Former User       Quit date:     Tobacco comment: 1 can dip a day   Substance Use Topics    Alcohol use: Yes       Comment: occ         Review of Systems   A comprehensive review of systems was negative except for that written in the HPI. Physical Exam  Visit Vitals  /86   Pulse 95   Ht 5' 7\" (1.702 m)   Wt 194 lb (88 kg)   SpO2 99%   BMI 30.38 kg/m²         Constitutional: Alert, oriented, cooperative patient in no acute distress; appears stated age    Eyes:Sclera are clear. EOMs intact  ENMT: no external lesions gross hearing normal; no obvious neck masses, no ear or lip lesions, nares normal  CV: RRR. Normal perfusion  Resp: No JVD. Breathing is  non-labored; no audible wheezing.     GI: soft and non-distended Musculoskeletal: unremarkable with normal function. No embolic signs or cyanosis. Neuro:  Oriented; moves all 4; no focal deficits  Psychiatric: normal affect and mood, no memory impairment        Labs:      Assessment/Plan:   Santiago Woods is a 53 y.o. male who has signs and symptoms consistent with need for screening colonoscopy. Patient verbalized understanding of importance for bowel prep. I spent 20 minutes with him this morning and greater than for percent of this time was spent in counseling and review of his chart.     1. Schedule screening colonoscopy. All questions were answered. The patient was counseled at length about the risks of aby Covid-19 during their perioperative period and any recovery window from their procedure.  The patient was made aware that aby Covid-19  may worsen their prognosis for recovering from their procedure and lend to a higher morbidity and/or mortality risk.  All material risks, benefits, and reasonable alternatives including postponing the procedure were discussed. The patient does  wish to proceed with the procedure at this time.                 I discussed the patient's condition and treatment options with the patient. I discussed risks of colonoscopy in language the patient could understand including bleeding, infection, aspiration, perforation, medication reaction, need for further endoscopy or surgery, abscess, fistula, SBO, DVT, PE, heart attack, stroke, renal failure, respiratory failure, ventilatory dependence, and death. The patient voiced understanding of all this and all questions were answered. Alternatives to colonoscopy were discussed also and risks of the alternatives. The patient requested that we proceed with colonoscopy. Informed consent was obtained. A copy of this note is sent to the referring physician.       Electronically signed by Ileana August MD

## 2021-06-16 NOTE — PROCEDURES
[facility]  PROCEDURE NOTE    Name:  Faith Oliveira  MR#:  [id number]  :  1970  ACCOUNT #:  [de-identified]  DATE OF SERVICE:  2021    PREOPERATIVE DIAGNOSIS:  Screening colonoscopy. POSTOPERATIVE DIAGNOSIS:  Screening colonoscopy with a normal exam.    PROCEDURE PERFORMED:  Colonoscopy. SURGEON:  Shayy Chauhan MD    ASSISTANT:  none. ANESTHESIA:  MAC.    COMPLICATIONS:  None. SPECIMENS REMOVED: None. IMPLANTS:  None. ESTIMATED BLOOD LOSS:  Minimal.    COUNT:  Correct. PROCEDURE:  After the patient was brought into the room, a time-out was carried out and all were in agreement. He was rolled onto his left side and MAC anesthesia was administered. A scope was inserted into the rectum and traversed all the way to the cecum. Slow withdrawal was then performed well over 7 minutes. No abnormalities found. The scope was withdrawn. The patient tolerated the procedure well.         MD CHRISTINA Uiras/[email or web address]  D:  2021 9:56  T:  2021 12:48  JOB #:  [id number]

## 2021-06-16 NOTE — ANESTHESIA POSTPROCEDURE EVALUATION
Procedure(s):  COLONOSCOPY/BMI 30.    total IV anesthesia    Anesthesia Post Evaluation        Patient location during evaluation: PACU  Patient participation: complete - patient participated  Level of consciousness: awake and alert  Pain management: adequate  Airway patency: patent  Anesthetic complications: no  Cardiovascular status: acceptable  Respiratory status: acceptable  Hydration status: acceptable  Comments: PCXR read as clear, SpO2 on RA good, stable for discharge  Post anesthesia nausea and vomiting:  none  Final Post Anesthesia Temperature Assessment:  Normothermia (36.0-37.5 degrees C)      INITIAL Post-op Vital signs:   Vitals Value Taken Time   /90 06/16/21 1042   Temp 36 °C (96.8 °F) 06/16/21 1034   Pulse 70 06/16/21 1044   Resp 18 06/16/21 1042   SpO2 96 % 06/16/21 1044   Vitals shown include unvalidated device data.

## 2021-06-16 NOTE — DISCHARGE INSTRUCTIONS
Gastrointestinal Colonoscopy/Flexible Sigmoidoscopy - Lower Exam Discharge Instructions  1. Call Dr. Maritza Hinton at [phone]  for any problems or questions. 2. Contact the doctors office for follow up appointment as directed  3. Medication may cause drowsiness for several hours, therefore:  · Do not drive or operate machinery for reminder of the day. · No alcohol today. · Do not make any important or legal decisions for 24 hours. · Do not sign any legal documents for 24 hours. 4. Ordinarily, you may resume regular diet and activity after exam unless otherwise specified by your physician. 5. Because of air put into your colon during exam, you may experience some abdominal distension, relieved by the passage of gas, for several hours. 6. Contact your physician if you have any of the following:  · Excessive amount of bleeding - large amount when having a bowel movement. Occasional specks of blood with bowel movement would not be unusual.  · Severe abdominal pain  · Fever or Chills  ·   Any additional instructions:  1. Office will contact you with any future appointments.

## 2021-06-16 NOTE — PROGRESS NOTES
X-ray staff to bedside to complete STAT CXR. Per Dr. Barragan Sink pt can have ice chips after CXR. Family member at bedside.

## 2021-06-16 NOTE — BRIEF OP NOTE
Brief Postoperative Note    Patient: Socorro Loja  YOB: 1970  MRN: [id number]    Date of Procedure: 6/16/2021     Pre-Op Diagnosis: Screen for colon cancer [Z12.11]    Post-Op Diagnosis: Same as preoperative diagnosis.   Normal exam    Procedure(s):  COLONOSCOPY/BMI 30    Surgeon(s):  Neli Joel MD    Surgical Assistant: None    Anesthesia: MAC     Estimated Blood Loss (mL): Minimal    Complications: None    Specimens: * No specimens in log *     Implants: * No implants in log *    Drains: * No LDAs found *    Findings: Normal exam    Electronically Signed by Marysol Patterson MD on 6/16/2021 at 9:53 AM

## 2021-06-16 NOTE — ADDENDUM NOTE
Addendum  created 06/16/21 1055 by Alexandra Mitchell MD    Attestation recorded in [address] [place] [address], [facility] 97 filed

## 2021-06-16 NOTE — ANESTHESIA PREPROCEDURE EVALUATION
Anesthetic History   No history of anesthetic complications            Review of Systems / Medical History  Patient summary reviewed and pertinent labs reviewed    Pulmonary          Smoker         Neuro/Psych         Psychiatric history (Depression)     Cardiovascular    Hypertension: well controlled          Hyperlipidemia    Exercise tolerance: >4 METS     GI/Hepatic/Renal     GERD: well controlled           Endo/Other    Diabetes: well controlled, type 2         Other Findings            Physical Exam    Airway  Mallampati: I  TM Distance: > 6 cm  Neck ROM: normal range of motion   Mouth opening: Normal     Cardiovascular  Regular rate and rhythm,  S1 and S2 normal,  no murmur, click, rub, or gallop  Rhythm: regular  Rate: normal         Dental  No notable dental hx       Pulmonary  Breath sounds clear to auscultation               Abdominal  GI exam deferred       Other Findings            Anesthetic Plan    ASA: 2  Anesthesia type: total IV anesthesia          Induction: Intravenous  Anesthetic plan and risks discussed with: Patient and Spouse

## 2021-09-09 ENCOUNTER — HOSPITAL ENCOUNTER (OUTPATIENT)
Dept: GENERAL RADIOLOGY | Age: 51
Discharge: HOME | End: 2021-09-09
Payer: COMMERCIAL

## 2021-09-09 DIAGNOSIS — M54.50 CHRONIC LEFT-SIDED LOW BACK PAIN WITHOUT SCIATICA: ICD-10-CM

## 2021-09-09 DIAGNOSIS — G89.29 CHRONIC LEFT-SIDED LOW BACK PAIN WITHOUT SCIATICA: ICD-10-CM

## 2021-09-09 DIAGNOSIS — M79.672 LEFT FOOT PAIN: ICD-10-CM

## 2021-09-09 DIAGNOSIS — M79.671 RIGHT FOOT PAIN: ICD-10-CM

## 2021-09-09 PROCEDURE — 72110 X-RAY EXAM L-2 SPINE 4/>VWS: CPT

## 2021-09-09 PROCEDURE — 73630 X-RAY EXAM OF FOOT: CPT

## 2022-03-18 PROBLEM — F41.9 ANXIETY: Status: ACTIVE | Noted: 2018-05-11

## 2022-03-18 PROBLEM — M75.41 SHOULDER IMPINGEMENT SYNDROME, RIGHT: Status: ACTIVE | Noted: 2018-05-11

## 2022-03-19 PROBLEM — I20.0 UNSTABLE ANGINA (HCC): Status: ACTIVE | Noted: 2017-03-29

## 2022-03-19 PROBLEM — G89.29 CHRONIC BILATERAL LOW BACK PAIN WITH BILATERAL SCIATICA: Status: ACTIVE | Noted: 2017-09-13

## 2022-03-19 PROBLEM — Z92.89 H/O DIAGNOSTIC TESTS: Status: ACTIVE | Noted: 2017-04-01

## 2022-03-19 PROBLEM — R06.02 SHORTNESS OF BREATH: Status: ACTIVE | Noted: 2017-03-29

## 2022-03-19 PROBLEM — M54.42 CHRONIC BILATERAL LOW BACK PAIN WITH BILATERAL SCIATICA: Status: ACTIVE | Noted: 2017-09-13

## 2022-03-19 PROBLEM — M54.41 CHRONIC BILATERAL LOW BACK PAIN WITH BILATERAL SCIATICA: Status: ACTIVE | Noted: 2017-09-13

## 2022-03-20 PROBLEM — R07.89 OTHER CHEST PAIN: Status: ACTIVE | Noted: 2018-08-29

## 2022-05-23 ENCOUNTER — OFFICE VISIT (OUTPATIENT)
Dept: FAMILY MEDICINE CLINIC | Facility: CLINIC | Age: 52
End: 2022-05-23
Payer: COMMERCIAL

## 2022-05-23 ENCOUNTER — HOSPITAL ENCOUNTER (OUTPATIENT)
Dept: GENERAL RADIOLOGY | Age: 52
Discharge: HOME | End: 2022-05-26

## 2022-05-23 VITALS
HEART RATE: 68 BPM | DIASTOLIC BLOOD PRESSURE: 84 MMHG | RESPIRATION RATE: 19 BRPM | SYSTOLIC BLOOD PRESSURE: 150 MMHG | WEIGHT: 185 LBS | TEMPERATURE: 97.2 F | OXYGEN SATURATION: 98 % | BODY MASS INDEX: 28.04 KG/M2 | HEIGHT: 68 IN

## 2022-05-23 DIAGNOSIS — M54.50 LUMBAR BACK PAIN: Primary | ICD-10-CM

## 2022-05-23 DIAGNOSIS — M54.50 LUMBAR BACK PAIN: ICD-10-CM

## 2022-05-23 PROCEDURE — 3017F COLORECTAL CA SCREEN DOC REV: CPT | Performed by: FAMILY MEDICINE

## 2022-05-23 PROCEDURE — 4004F PT TOBACCO SCREEN RCVD TLK: CPT | Performed by: FAMILY MEDICINE

## 2022-05-23 PROCEDURE — 99214 OFFICE O/P EST MOD 30 MIN: CPT | Performed by: FAMILY MEDICINE

## 2022-05-23 PROCEDURE — G8427 DOCREV CUR MEDS BY ELIG CLIN: HCPCS | Performed by: FAMILY MEDICINE

## 2022-05-23 PROCEDURE — G8419 CALC BMI OUT NRM PARAM NOF/U: HCPCS | Performed by: FAMILY MEDICINE

## 2022-05-23 PROCEDURE — 96372 THER/PROPH/DIAG INJ SC/IM: CPT | Performed by: FAMILY MEDICINE

## 2022-05-23 RX ORDER — PREDNISONE 20 MG/1
20 TABLET ORAL 2 TIMES DAILY
Qty: 10 TABLET | Refills: 0 | Status: SHIPPED | OUTPATIENT
Start: 2022-05-23 | End: 2022-05-28

## 2022-05-23 RX ORDER — METHYLPREDNISOLONE ACETATE 80 MG/ML
80 INJECTION, SUSPENSION INTRA-ARTICULAR; INTRALESIONAL; INTRAMUSCULAR; SOFT TISSUE ONCE
Status: SHIPPED | OUTPATIENT
Start: 2022-05-23

## 2022-05-23 RX ORDER — METHYLPREDNISOLONE ACETATE 40 MG/ML
80 INJECTION, SUSPENSION INTRA-ARTICULAR; INTRALESIONAL; INTRAMUSCULAR; SOFT TISSUE ONCE
Status: COMPLETED | OUTPATIENT
Start: 2022-05-23 | End: 2022-05-23

## 2022-05-23 RX ADMIN — METHYLPREDNISOLONE ACETATE 80 MG: 40 INJECTION, SUSPENSION INTRA-ARTICULAR; INTRALESIONAL; INTRAMUSCULAR; SOFT TISSUE at 11:08

## 2022-05-23 ASSESSMENT — PATIENT HEALTH QUESTIONNAIRE - PHQ9
2. FEELING DOWN, DEPRESSED OR HOPELESS: 0
1. LITTLE INTEREST OR PLEASURE IN DOING THINGS: 0
SUM OF ALL RESPONSES TO PHQ9 QUESTIONS 1 & 2: 0
SUM OF ALL RESPONSES TO PHQ QUESTIONS 1-9: 0

## 2022-05-23 ASSESSMENT — ENCOUNTER SYMPTOMS: BACK PAIN: 1

## 2022-05-23 NOTE — PROGRESS NOTES
Jackson Kunz (:  1970) is a 49 y.o. male,Established patient, here for evaluation of the following chief complaint(s):  Back Pain (Pain in lower back and running down left leg. Has had this problem in the past prior to a surgery. I wanting to see about getting x-rays done.)         ASSESSMENT/PLAN:  1. Lumbar back pain  -     XR LUMBAR SPINE (2-3 VIEWS); Future  -     predniSONE (DELTASONE) 20 MG tablet; Take 1 tablet by mouth 2 times daily for 5 days, Disp-10 tablet, R-0Normal  -     methylPREDNISolone acetate (DEPO-MEDROL) injection 80 mg; 80 mg, IntraMUSCular, ONCE, 1 dose, On 22 at 1115  [facility], [address] [place] [address]  -     methylPREDNISolone acetate (DEPO-MEDROL) injection 80 mg; 80 mg, IntraMUSCular, ONCE, 1 dose, On 22 at 1130  steroids, xray, and pt. Explained he may need an MRI. Will not be able to give narcotic pain medications 2/2 substance abuse and rx meds from someone else being used by patient. Return if symptoms worsen or fail to improve. Subjective   SUBJECTIVE/OBJECTIVE:  HPI  Chief Complaint   Patient presents with    Back Pain     Pain in lower back and running down left leg. Has had this problem in the past prior to a surgery. I wanting to see about getting x-rays done. Had surgery in the past   Past Surgical History:   Procedure Laterality Date    BREAST SURGERY Left     cyst removed    COLONOSCOPY N/A 2021    COLONOSCOPY/BMI 30 performed by Tera Condon MD at [facility] ENDOSCOPY    NEUROLOGICAL SURGERY      herniated disc, discectomy by Dr Poncho Kebede     ORTHOPEDIC SURGERY Left 13    Tendon repair to treat foot drop in left ankle by Dr. Mitali Quintero Left 2012    ORIF Left Ankle by Dr. Arlet Mario     Has had a discectomy in the past for herniated lumbar disc. He then was in pain management and had an injection. He has left foot drop.   He trips often and tripped recently. Back has been hurtin in the left low back, down through his hip and radiating down into his left leg. Feels like electricity and throbbing stabbing pain. Has to put pressure all on the right side to get relief. Taking capsicus unnuum 6XHPUS, colocynthis 6X HPUS, Gnaphalium polycephalum, magnesia phosphorica with some relief. Also has been using THC as well as his wife's oxycodone leftover from prior surgery. Review of Systems   Constitutional: Positive for diaphoresis (due to pian). Musculoskeletal: Positive for back pain and gait problem (2/2 pain). Neurological: Positive for weakness. Negative for dizziness, tremors, numbness and headaches. Objective   Physical Exam  Vitals reviewed. Constitutional:       General: He is in acute distress (cannot sit or stand comfortably). HENT:      Head: Normocephalic. Cardiovascular:      Rate and Rhythm: Normal rate. Musculoskeletal:      Lumbar back: Spasms and tenderness present. No swelling, deformity or bony tenderness. Decreased range of motion. Back:       Comments: Difficult to assess 2/2 pain, patient could not sit or lie down for more thorough exam.    Neurological:      Mental Status: He is alert. BP (!) 150/84 (Site: Left Upper Arm, Position: Sitting, Cuff Size: Large Adult)   Pulse 68   Temp 97.2 °F (36.2 °C)   Resp 19   Ht 5' 8\" (1.727 m)   Wt 185 lb (83.9 kg)   SpO2 98%   BMI 28.13 kg/m²            An electronic signature was used to authenticate this note.     --Fabiola Chen MD

## 2022-05-25 ENCOUNTER — TELEPHONE (OUTPATIENT)
Dept: FAMILY MEDICINE CLINIC | Facility: CLINIC | Age: 52
End: 2022-05-25

## 2022-05-31 ENCOUNTER — HOSPITAL ENCOUNTER (OUTPATIENT)
Dept: PHYSICAL THERAPY | Age: 52
Setting detail: RECURRING SERIES
Discharge: HOME | End: 2022-06-03
Payer: COMMERCIAL

## 2022-05-31 PROCEDURE — 97161 PT EVAL LOW COMPLEX 20 MIN: CPT

## 2022-05-31 PROCEDURE — 97110 THERAPEUTIC EXERCISES: CPT

## 2022-05-31 ASSESSMENT — PAIN SCALES - GENERAL: PAINLEVEL_OUTOF10: 6

## 2022-05-31 NOTE — PLAN OF CARE
Jackson Jameson  : 1970  Primary: [facility]  Secondary:  [facility]  [address] [place] [address]  Phone: [phone]  Fax: [phone] Plan Frequency: 1 time a week    Plan of Care/Certification Expiration Date: 22      PT Visit Info: Total # of Visits to Date: 1      OUTPATIENT PHYSICAL THERAPY:OP NOTE TYPE: Initial Assessment 2022               Episode  Appt Desk         Treatment Diagnosis:  Generalized Muscle Weakness (M62.81)  Difficulty in walking, Not elsewhere classified (R26.2)  Low Back Pain (M54.5)    Medical/Referring Diagnosis:  Low back pain, unspecified [M54.50]  Referring Physician:  Aston Pagan MD Orders:  PT Eval and Treat   Return MD Appt:  NA  Date of Onset:  Onset Date: 12     Allergies:  Patient has no known allergies. Restrictions/Precautions:    Restrictions/Precautions: None  Required Braces or Orthoses?: No  No data recorded   Medications Last Reviewed:  2022     SUBJECTIVE   History of Injury/Illness (Reason for Referral):  Has had a discectomy in the past for herniated lumbar disc. He then was in pain management and had an injection. Patient  has left foot drop. He reports having decreased balance and trips often due to lack of dorsiflexion. Back has been hurtin in the left low back, down through his hip and radiating down into his left leg. Feels like electricity and throbbing stabbing pain. Has to put pressure all on the right side to get relief. Reports that this has been a chronic problem for the past ten years now. Patient Stated Goal(s):  \"Patient wants to ambulate with better gait mechanics and wants to be able to manage pain better.  \"  Initial:     6/10 Post Session:     6/10  Past Medical History/Comorbidities:   Mr. Nancy Sorto  has a past medical history of Anxiety state, unspecified, Back pain, Chronic pain, COVID-19 vaccine series completed, Depressive disorder, not elsewhere classified, Diabetes ([facility] [address] Dysmetabolic syndrome X, Foot drop, left, GERD (gastroesophageal reflux disease), Hypercholesterolemia, Hypertension, Impotence of organic origin, Insomnia, unspecified, Thoracic or lumbosacral neuritis or radiculitis, unspecified, and Tobacco use disorder. Mr. Rangel Reyes  has a past surgical history that includes Colonoscopy (N/A, 6/16/2021); orthopedic surgery (Left, 9/24/13); orthopedic surgery (Left, 12/2012); neurological surgery; and Breast surgery (Left). Social History/Living Environment:   Lives With: Spouse  Type of Home: House  Home Layout: Two level    not working   Prior Level of Function/Work/Activity:   Prior level of function: Independent  Occupation: On disability  No data recordedNo data recorded    Learning:   Does the patient/guardian have any barriers to learning?: No barriers  Will there be a co-learner?: No  What is the preferred language of the patient/guardian?: English  Is an  required?: No  How does the patient/guardian prefer to learn new concepts?: Listening; Reading; Demonstration; Pictures/Videos     Fall Risk Scale: Total Score: 40  Vazquez Fall Risk: Medium (25-44)     Dominant Side:  right handed        OBJECTIVE      Observation: Patient demonstrates decreased stance time on LLE while ambulating as well as L foot drop and foot clearance. While seated patient has increased weightbearing on R hip indicating avoidance of pain.    AROM: % of normal motion in standing     Date:  5/31 Date:   Date:     Direction  Parameters Parameters Parameters   Lumbar Flexion  50% w/ pain     Lumbar Extension  75% w/ pain     Lumbar Rotation  R: WNL  L: limited with pain     Lumbar Side Bending  R: WNL  L: Limited with pain     Hip R: WNL   L: Limited with sciatica symptoms       Strength Date:  5/31 Date:   Date:      Parameters Parameters Parameters   Hip Flex R: 4-/5  L: 3+/5     Hip Ext R:   L:     Hip ABD R: 4-/5  L: 3+/5     Knee Ext R: 4-/5  L: 3+/5     Knee Flex R: 4-/5  L: 3+/5     Ankle DF R: 4/5  L: 3/5     Ankle PF R:  L:     Ankle INV R:  L:     Ankle EV R:  L:     Double Squat (L4) unable     Heel Walk (L5) unable     Toe Walk (S1) unable         Special Test/Function:  Pelvic Obliquity:  Rotational Fault:  Accessory Mobility/:  SI Compression:  SLR: Positive on L side at 60 deg  Slump: positive   Edison:          ASSESSMENT   Initial Assessment: Mr. Pamela Damon presents to PT with c/o of chronic low back pain with L sciatica that has been present for approximately 10 years now. Patient reports being in pain at all times (6/10) and that the pain is aggravated with any kind of movement. He demonstrates L foot drop during ambulation and reports shooting sharp pain following the sciatic pattern. Pt demonstrated significant tenderness to palpation to the B paraspinals as well as L QL. With testing, pt displayed decreased core strength, overall mobility, and LLE strength. Additionally, he scored a 30 on the modified Oswestry. Mr. Pamela Damon will benefit from continued skilled PT services to improve deficits listed below and to progress towards his PLOF. Problem List: (Impacting functional limitations): Body Structures, Functions, Activity Limitations Requiring Skilled Therapeutic Intervention: Decreased functional mobility ; Decreased ADL status; Decreased ROM; Decreased body mechanics; Decreased tolerance to work activity; Decreased strength; Decreased endurance; Decreased balance; Decreased coordination;  Increased pain; Decreased posture     Therapy Prognosis:   Therapy Prognosis: Good     Assessment Complexity:   Low Complexity  PLAN   Effective Dates: 5/31/22 TO 81/64/81 Plan of Care/Certification Expiration Date: 08/29/22     Frequency/Duration: Plan Frequency: 1 time a week     Interventions Planned (Treatment may consist of any combination of the following):    Current Treatment Recommendations: Strengthening; ROM; Balance training; Functional mobility training; Gait training; Neuromuscular re-education; Manual Therapy - Soft Tissue Mobilization; Manual Therapy - Joint Manipulation; Home exercise program; Modalities       GOALS: (Goals have been discussed and agreed upon with patient.)  Short-Term Functional Goals: Time Frame: 6 weeks  1. Pt will be independent w/ HEP in order to improve outcomes and decrease pain. 2. Pt will report pain of 5 or less while performing ADLs in order to improve functional mobility. 3. Pt will improve SLR to >75 degrees to demonstrate improvement in hamstring flexibility and decrease in sciatica symptoms. 4. Pt will demonstrate proper body mechanics when picking something up from the ground to decrease overall s/p which will improve overall functional mobility.     Discharge Goals: Time Frame: 12 weeks  1. Pt will have Oswestry score of 10 or less in order to report decreased pain and decreased functional impairments. 2. Pt will improve overall LLE strength to 4+/5 in order to increase functional mobility.      3. Pt will improve lumbar AROM to greater than 75% with no pain to improve overall mobility and allow to return to prior level of function. 4. Pt will demonstrate improved posture while standing to help decrease radiculopathy symptoms and improve quality of life. 5. Pt will demonstrate improve dynamic balance as well as improving gait mechanics in order to decrease overall fall risk. Outcome Measure: Tool Used: Modified Oswestry Low Back Pain Questionnaire  Score:  Initial: 30/50  Most Recent: X/50 (Date: -- )   Interpretation of Score: Each section is scored on a 0-5 scale, 5 representing the greatest disability. The scores of each section are added together for a total score of 50.         Medical Necessity:   Patient is expected to demonstrate progress in strength, range of motion, balance, coordination and functional technique to increase independence with activities of daily living. .  Reason For Services/Other Comments:  Patient continues to demonstrate capacity to improve range of motion, strength, pain tolerance, balance, and functional mobility which will increase independence. Total Duration: 45 total minutes (30 minutes initial evaluation, 15 minutes therex)  Time In: 0804  Time Out: 2187    Access Code: [email or web address]  URL: [email or web address]  Date: 05/31/2022  Prepared by: Jennifer Garcia    Exercises  Seated Sciatic Tensioner - 1 x daily - 7 x weekly - 3 sets - 10 reps  Supine Piriformis Stretch with Foot on Ground - 1 x daily - 7 x weekly - 3 sets - 10 reps  Heel Toe Raises with Counter Support - 1 x daily - 7 x weekly - 3 sets - 10 reps  Seated Quadratus Lumborum Stretch in Chair - 1 x daily - 7 x weekly - 3 sets - 10 reps          Regarding [facility] therapy, I certify that the treatment plan above will be carried out by a therapist or under their direction. Thank you for this referral,  Jennifer Garcia PT     Referring Physician Signature: Fabiano General* No Signature is Required for this note.         Post Session Pain  Charge Capture   POC Link  Treatment Note Link  MD Chaparro Yu

## 2022-06-01 NOTE — PROGRESS NOTES
Date:     Activity/Exercise Parameters Parameters Parameters   Sciatic nerve flossing X 10 in seated, small range     Piriformis stretch 5 x 10'' holds     Toe raises Against wall x 10      QL stretch 5 x 10'' holds to R side                           MANUAL THERAPY: (0 minutes):   Joint mobilization, Soft tissue mobilization and Manipulation was utilized and necessary because of the patient's restricted joint motion, painful spasm, loss of articular motion and restricted motion of soft tissue. MODALITIES: (0 minutes):         Access Code: [email or web address]  URL: [email or web address]  Date: 05/31/2022  Prepared by: Deon Cornell     Exercises  Seated Sciatic Tensioner - 1 x daily - 7 x weekly - 3 sets - 10 reps  Supine Piriformis Stretch with Foot on Ground - 1 x daily - 7 x weekly - 3 sets - 10 reps  Heel Toe Raises with Counter Support - 1 x daily - 7 x weekly - 3 sets - 10 reps  Seated Quadratus Lumborum Stretch in Chair - 1 x daily - 7 x weekly - 3 sets - 10 reps    Treatment/Session Summary:    · Treatment Assessment:  Initial evaluation was completed today. Patient verbalized and demonstrated understanding of HEP. · Communication/Consultation:  None today  · Equipment provided today:  None  · Recommendations/Intent for next treatment session: Next visit will focus on increasing overall anterior tibialis strength as well as improving lumbar ROM.     Total Treatment Billable Duration:  45 minutes (30 minutes initial evaluation, 15 minutes therex)    Time In: 0804  Time Out: [address], PT       Charge Capture  }Post Session Pain  Med[employer] Portal  MD Guidelines  Scanned Media  Benefits  MyChart    Future Appointments   Date Time Provider Ra Black   6/6/2022  8:00 AM Elder Naas, PT [facility]   6/14/2022  8:45 AM Elder Naas, PT [facility]   6/20/2022  8:00 AM Elder Naas, PT [facility]   6/27/2022  8:00 AM Elder Naas, PT [facility]   7/5/2022  8:00 AM Elder Naas, PT [facility]   7/11/2022  8:45 AM Froy Enrique, PT [facility]

## 2022-06-02 ENCOUNTER — TELEPHONE (OUTPATIENT)
Dept: FAMILY MEDICINE CLINIC | Facility: CLINIC | Age: 52
End: 2022-06-02

## 2022-06-02 NOTE — TELEPHONE ENCOUNTER
----- Message from Feliberto Metzger MD sent at 6/1/2022  5:37 PM EDT -----  No new changes in the lumbar spine.

## 2022-08-05 ENCOUNTER — TELEMEDICINE (OUTPATIENT)
Dept: FAMILY MEDICINE CLINIC | Facility: CLINIC | Age: 52
End: 2022-08-05
Payer: COMMERCIAL

## 2022-08-05 DIAGNOSIS — M54.16 LUMBAR RADICULOPATHY, CHRONIC: Primary | ICD-10-CM

## 2022-08-05 PROCEDURE — 3017F COLORECTAL CA SCREEN DOC REV: CPT | Performed by: FAMILY MEDICINE

## 2022-08-05 PROCEDURE — G8427 DOCREV CUR MEDS BY ELIG CLIN: HCPCS | Performed by: FAMILY MEDICINE

## 2022-08-05 PROCEDURE — 99214 OFFICE O/P EST MOD 30 MIN: CPT | Performed by: FAMILY MEDICINE

## 2022-08-05 RX ORDER — PREDNISONE 20 MG/1
20 TABLET ORAL 2 TIMES DAILY
Qty: 10 TABLET | Refills: 0 | Status: SHIPPED | OUTPATIENT
Start: 2022-08-05 | End: 2022-08-10

## 2022-08-05 ASSESSMENT — ENCOUNTER SYMPTOMS: BACK PAIN: 1

## 2022-08-05 NOTE — PROGRESS NOTES
Jackson Bear (:  1970) is a Established patient, here for evaluation of the following:    Assessment & Plan   Below is the assessment and plan developed based on review of pertinent history, physical exam, labs, studies, and medications. 1. Lumbar radiculopathy, chronic  -     predniSONE (DELTASONE) 20 MG tablet; Take 1 tablet by mouth in the morning and 1 tablet before bedtime. Do all this for 5 days. , Disp-10 tablet, R-0Normal  -     MRI LUMBAR SPINE WO CONTRAST; Future  Oral steroids today and if no improvement, can come in on Monday for IM depomedrol. No follow-ups on file. Subjective   HPI  Chief Complaint   Patient presents with    Back Pain     Lumbar back and radiates down the left side, started beginning of the week. Has been feeling better lately, but this week, the pain in the low back has come back and irritating the sciatic nerve going down his left leg. In the past he has done well w/ a steroid IM injection. Longstanding h/o low back w/ foot drop as well as intermittent radicular symptoms. Review of Systems   Constitutional:  Negative for fever. Musculoskeletal:  Positive for back pain. Skin:  Negative for rash. Neurological:  Positive for weakness and numbness. All other systems reviewed and are negative.        Objective     Physical Exam  [INSTRUCTIONS:  \"[x]\" Indicates a positive item  \"[]\" Indicates a negative item  -- DELETE ALL ITEMS NOT EXAMINED]    Constitutional: [x] Appears well-developed and well-nourished [] No apparent distress      [x] Abnormal - appears to be in pain    Mental status: [x] Alert and awake  [x] Oriented to person/place/time [x] Able to follow commands    [] Abnormal -     Eyes:   EOM    [x]  Normal    [] Abnormal -   Sclera  [x]  Normal    [] Abnormal -          Discharge [x]  None visible   [] Abnormal -     HENT: [x] Normocephalic, atraumatic  [] Abnormal -   [x] Mouth/Throat: Mucous membranes are moist    External Ears [x] Normal  [] Abnormal -    Neck: [x] No visualized mass [] Abnormal -     Pulmonary/Chest: [x] Respiratory effort normal   [x] No visualized signs of difficulty breathing or respiratory distress        [] Abnormal -      Musculoskeletal:   [x] Normal gait with no signs of ataxia         [x] Normal range of motion of neck        [] Abnormal -     Neurological:        [x] No Facial Asymmetry (Cranial nerve 7 motor function) (limited exam due to video visit)          [x] No gaze palsy        [] Abnormal -          Skin:        [x] No significant exanthematous lesions or discoloration noted on facial skin         [] Abnormal -            Psychiatric:       [x] Normal Affect [] Abnormal -        [x] No Hallucinations    Other pertinent observable physical exam findings:-       Patient-Reported Vitals 8/5/2022   Patient-Reported Weight 195lbs   Patient-Reported Height 5f8      Patient-Reported Vitals  Patient-Reported Weight: 195lbs  Patient-Reported Height: 5f8          Jackson [place], was evaluated through a synchronous (real-time) audio-video encounter. The patient (or guardian if applicable) is aware that this is a billable service, which includes applicable co-pays. This Virtual Visit was conducted with patient's (and/or legal guardian's) consent. The visit was conducted pursuant to the emergency declaration under the [address] [place] [address] authority and the [employer] General Act. Patient identification was verified, and a caregiver was present when appropriate. The patient was located at Home: [place] [address].  [facility] [address].    Provider was located at Home ([facility]): Oanh Licona MD

## 2022-08-16 ENCOUNTER — HOSPITAL ENCOUNTER (OUTPATIENT)
Dept: MRI IMAGING | Age: 52
Discharge: HOME | End: 2022-08-19
Payer: COMMERCIAL

## 2022-08-16 DIAGNOSIS — M54.16 LUMBAR RADICULOPATHY, CHRONIC: ICD-10-CM

## 2022-08-16 PROCEDURE — 72148 MRI LUMBAR SPINE W/O DYE: CPT

## 2022-10-20 ENCOUNTER — OFFICE VISIT (OUTPATIENT)
Dept: FAMILY MEDICINE CLINIC | Facility: CLINIC | Age: 52
End: 2022-10-20
Payer: COMMERCIAL

## 2022-10-20 VITALS
OXYGEN SATURATION: 97 % | BODY MASS INDEX: 29.1 KG/M2 | RESPIRATION RATE: 19 BRPM | HEART RATE: 75 BPM | TEMPERATURE: 96.8 F | DIASTOLIC BLOOD PRESSURE: 84 MMHG | HEIGHT: 68 IN | WEIGHT: 192 LBS | SYSTOLIC BLOOD PRESSURE: 137 MMHG

## 2022-10-20 DIAGNOSIS — G89.29 CHRONIC RADICULAR LOW BACK PAIN: ICD-10-CM

## 2022-10-20 DIAGNOSIS — M54.41 CHRONIC BILATERAL LOW BACK PAIN WITH BILATERAL SCIATICA: Primary | ICD-10-CM

## 2022-10-20 DIAGNOSIS — M54.42 CHRONIC BILATERAL LOW BACK PAIN WITH BILATERAL SCIATICA: Primary | ICD-10-CM

## 2022-10-20 DIAGNOSIS — M54.16 CHRONIC RADICULAR LOW BACK PAIN: ICD-10-CM

## 2022-10-20 DIAGNOSIS — G89.29 CHRONIC BILATERAL LOW BACK PAIN WITH BILATERAL SCIATICA: Primary | ICD-10-CM

## 2022-10-20 PROCEDURE — 99214 OFFICE O/P EST MOD 30 MIN: CPT | Performed by: FAMILY MEDICINE

## 2022-10-20 PROCEDURE — G8484 FLU IMMUNIZE NO ADMIN: HCPCS | Performed by: FAMILY MEDICINE

## 2022-10-20 PROCEDURE — G8427 DOCREV CUR MEDS BY ELIG CLIN: HCPCS | Performed by: FAMILY MEDICINE

## 2022-10-20 PROCEDURE — 3074F SYST BP LT 130 MM HG: CPT | Performed by: FAMILY MEDICINE

## 2022-10-20 PROCEDURE — 3017F COLORECTAL CA SCREEN DOC REV: CPT | Performed by: FAMILY MEDICINE

## 2022-10-20 PROCEDURE — 3078F DIAST BP <80 MM HG: CPT | Performed by: FAMILY MEDICINE

## 2022-10-20 PROCEDURE — 4004F PT TOBACCO SCREEN RCVD TLK: CPT | Performed by: FAMILY MEDICINE

## 2022-10-20 PROCEDURE — G8419 CALC BMI OUT NRM PARAM NOF/U: HCPCS | Performed by: FAMILY MEDICINE

## 2022-10-20 RX ORDER — GABAPENTIN 100 MG/1
300 CAPSULE ORAL 3 TIMES DAILY
Qty: 270 CAPSULE | Refills: 5 | Status: SHIPPED | OUTPATIENT
Start: 2022-10-20 | End: 2022-10-25 | Stop reason: SDUPTHER

## 2022-10-20 RX ORDER — CARISOPRODOL 350 MG/1
350 TABLET ORAL 3 TIMES DAILY PRN
Qty: 30 TABLET | Refills: 0 | Status: SHIPPED | OUTPATIENT
Start: 2022-10-20 | End: 2022-10-25 | Stop reason: SDUPTHER

## 2022-10-20 RX ORDER — IBUPROFEN 800 MG/1
800 TABLET ORAL EVERY 8 HOURS PRN
Qty: 120 TABLET | Refills: 2 | Status: SHIPPED | OUTPATIENT
Start: 2022-10-20 | End: 2022-10-25 | Stop reason: SDUPTHER

## 2022-10-20 NOTE — PROGRESS NOTES
Jackson Duffy (:  1970) is a Established patient, here for evaluation of the following:    Assessment & Plan   Below is the assessment and plan developed based on review of pertinent history, physical exam, labs, studies, and medications. 1. Chronic bilateral low back pain with bilateral sciatica  2. Chronic radicular low back pain  Increase gabapentin to help w/ pain over time. Ok to take up to 300mg tid by gradually increasing by 100mg every few days. Sent in soma as well to help with spasms. Refilled ibuprofen. Offered pain management referral.   Return in about 3 months (around 2023). Subjective   HPI  Chief Complaint   Patient presents with    Back Pain     Left lower side, going into left leg. Problems with Neuropathy. Other     As been over 10 years since the last Tdap shot. Declined the Flu shot. Has never had the Pneumonia vac. Currently taking gabapentin 100mg BID. He was told to increase it on the bottle but did not read this. Having spasms in the left lower back radiating down his leg. Flexeril helps a little, but not much. Recent MRI showed stability of disease. He has seen neurosurg and pain management and is not amenable to any further injections or spinal cord stimulators at this time. Review of Systems   Constitutional:  Negative for fatigue and unexpected weight change. HENT:  Negative for congestion. Eyes:  Negative for visual disturbance. Respiratory:  Negative for shortness of breath. Cardiovascular:  Negative for chest pain, palpitations and leg swelling. Gastrointestinal:  Negative for abdominal pain, constipation, diarrhea and nausea. Genitourinary:  Negative for dysuria. Musculoskeletal:  Positive for back pain and myalgias. Skin:  Negative for rash. Neurological:  Negative for light-headedness. Psychiatric/Behavioral:  Negative for dysphoric mood. Objective     Physical Exam  Vitals reviewed. Constitutional:       Appearance: Normal appearance. HENT:      Head: Normocephalic and atraumatic. Nose: Nose normal.      Mouth/Throat:      Mouth: Mucous membranes are moist.      Pharynx: Oropharynx is clear. Eyes:      Extraocular Movements: Extraocular movements intact. Conjunctiva/sclera: Conjunctivae normal.      Pupils: Pupils are equal, round, and reactive to light. Cardiovascular:      Rate and Rhythm: Normal rate and regular rhythm. Pulses: Normal pulses. Heart sounds: Normal heart sounds. Pulmonary:      Effort: Pulmonary effort is normal.      Breath sounds: Normal breath sounds. Abdominal:      General: Abdomen is flat. Bowel sounds are normal.      Palpations: Abdomen is soft. Musculoskeletal:         General: Normal range of motion. Lumbar back: Spasms and tenderness present. No swelling or deformity. Normal range of motion. Back:       Comments: Area of pain and spasm. Skin:     General: Skin is warm and dry. Neurological:      General: No focal deficit present. Mental Status: He is alert.    2  Patient-Reported Vitals 8/5/2022   Patient-Reported Weight 195lbs   Patient-Reported Height 5f8      Patient-Reported Vitals  No data recorded         --Roro Marks MD

## 2022-10-25 DIAGNOSIS — M54.42 CHRONIC BILATERAL LOW BACK PAIN WITH BILATERAL SCIATICA: ICD-10-CM

## 2022-10-25 DIAGNOSIS — M54.41 CHRONIC BILATERAL LOW BACK PAIN WITH BILATERAL SCIATICA: ICD-10-CM

## 2022-10-25 DIAGNOSIS — G89.29 CHRONIC BILATERAL LOW BACK PAIN WITH BILATERAL SCIATICA: ICD-10-CM

## 2022-10-25 DIAGNOSIS — M54.16 CHRONIC RADICULAR LOW BACK PAIN: ICD-10-CM

## 2022-10-25 DIAGNOSIS — G89.29 CHRONIC RADICULAR LOW BACK PAIN: ICD-10-CM

## 2022-10-25 RX ORDER — ATORVASTATIN CALCIUM 40 MG/1
40 TABLET, FILM COATED ORAL DAILY
Qty: 90 TABLET | Refills: 1 | Status: SHIPPED | OUTPATIENT
Start: 2022-10-25

## 2022-10-25 RX ORDER — IBUPROFEN 800 MG/1
800 TABLET ORAL EVERY 8 HOURS PRN
Qty: 120 TABLET | Refills: 2 | Status: SHIPPED | OUTPATIENT
Start: 2022-10-25

## 2022-10-25 RX ORDER — TADALAFIL 10 MG/1
10 TABLET ORAL PRN
Qty: 90 TABLET | Refills: 1 | Status: SHIPPED | OUTPATIENT
Start: 2022-10-25

## 2022-10-25 RX ORDER — GABAPENTIN 100 MG/1
300 CAPSULE ORAL 3 TIMES DAILY
Qty: 270 CAPSULE | Refills: 5 | Status: SHIPPED | OUTPATIENT
Start: 2022-10-25 | End: 2023-04-23

## 2022-10-25 RX ORDER — PANTOPRAZOLE SODIUM 40 MG/1
40 TABLET, DELAYED RELEASE ORAL DAILY
Qty: 90 TABLET | Refills: 1 | Status: SHIPPED | OUTPATIENT
Start: 2022-10-25

## 2022-10-25 RX ORDER — CARISOPRODOL 350 MG/1
350 TABLET ORAL 3 TIMES DAILY PRN
Qty: 30 TABLET | Refills: 0 | Status: SHIPPED | OUTPATIENT
Start: 2022-10-25 | End: 2022-11-04

## 2022-10-25 RX ORDER — AMLODIPINE BESYLATE 10 MG/1
10 TABLET ORAL DAILY
Qty: 90 TABLET | Refills: 1 | Status: SHIPPED | OUTPATIENT
Start: 2022-10-25

## 2022-10-25 RX ORDER — CYCLOBENZAPRINE HCL 5 MG
5 TABLET ORAL 3 TIMES DAILY PRN
Qty: 30 TABLET | Refills: 1 | OUTPATIENT
Start: 2022-10-25

## 2022-10-25 ASSESSMENT — ENCOUNTER SYMPTOMS
CONSTIPATION: 0
NAUSEA: 0
ABDOMINAL PAIN: 0
DIARRHEA: 0
SHORTNESS OF BREATH: 0
BACK PAIN: 1

## 2022-10-25 NOTE — TELEPHONE ENCOUNTER
Patient called and left a message stating that the medication that was sent in on 10-20-22 was sent to the wrong pharmacy. He uses the [facility] in [place]. He added in that his other medication are needing refills as well.

## 2022-12-19 ENCOUNTER — OFFICE VISIT (OUTPATIENT)
Dept: FAMILY MEDICINE CLINIC | Facility: CLINIC | Age: 52
End: 2022-12-19
Payer: COMMERCIAL

## 2022-12-19 VITALS
TEMPERATURE: 96.8 F | HEART RATE: 76 BPM | OXYGEN SATURATION: 98 % | WEIGHT: 191.8 LBS | DIASTOLIC BLOOD PRESSURE: 90 MMHG | HEIGHT: 68 IN | SYSTOLIC BLOOD PRESSURE: 129 MMHG | BODY MASS INDEX: 29.07 KG/M2

## 2022-12-19 DIAGNOSIS — R07.89 FEELING OF CHEST TIGHTNESS: ICD-10-CM

## 2022-12-19 DIAGNOSIS — Z00.00 ENCOUNTER FOR WELL ADULT EXAM WITHOUT ABNORMAL FINDINGS: Primary | ICD-10-CM

## 2022-12-19 DIAGNOSIS — Z00.00 ENCOUNTER FOR WELL ADULT EXAM WITHOUT ABNORMAL FINDINGS: ICD-10-CM

## 2022-12-19 LAB
BASOPHILS # BLD: 0.1 K/UL (ref 0–0.2)
BASOPHILS NFR BLD: 1 % (ref 0–2)
DIFFERENTIAL METHOD BLD: ABNORMAL
EOSINOPHIL # BLD: 0.1 K/UL (ref 0–0.8)
EOSINOPHIL NFR BLD: 2 % (ref 0.5–7.8)
ERYTHROCYTE [DISTWIDTH] IN BLOOD BY AUTOMATED COUNT: 15.5 % (ref 11.9–14.6)
EST. AVERAGE GLUCOSE BLD GHB EST-MCNC: 128 MG/DL
HBA1C MFR BLD: 6.1 % (ref 4.8–5.6)
HCT VFR BLD AUTO: 44.9 % (ref 41.1–50.3)
HGB BLD-MCNC: 14.3 G/DL (ref 13.6–17.2)
IMM GRANULOCYTES # BLD AUTO: 0 K/UL (ref 0–0.5)
IMM GRANULOCYTES NFR BLD AUTO: 0 % (ref 0–5)
LYMPHOCYTES # BLD: 2 K/UL (ref 0.5–4.6)
LYMPHOCYTES NFR BLD: 31 % (ref 13–44)
MCH RBC QN AUTO: 26.9 PG (ref 26.1–32.9)
MCHC RBC AUTO-ENTMCNC: 31.8 G/DL (ref 31.4–35)
MCV RBC AUTO: 84.4 FL (ref 82–102)
MONOCYTES # BLD: 1 K/UL (ref 0.1–1.3)
MONOCYTES NFR BLD: 15 % (ref 4–12)
NEUTS SEG # BLD: 3.4 K/UL (ref 1.7–8.2)
NEUTS SEG NFR BLD: 51 % (ref 43–78)
NRBC # BLD: 0 K/UL (ref 0–0.2)
PLATELET # BLD AUTO: 147 K/UL (ref 150–450)
PMV BLD AUTO: 12.9 FL (ref 9.4–12.3)
RBC # BLD AUTO: 5.32 M/UL (ref 4.23–5.6)
WBC # BLD AUTO: 6.6 K/UL (ref 4.3–11.1)

## 2022-12-19 PROCEDURE — 3078F DIAST BP <80 MM HG: CPT | Performed by: FAMILY MEDICINE

## 2022-12-19 PROCEDURE — 99396 PREV VISIT EST AGE 40-64: CPT | Performed by: FAMILY MEDICINE

## 2022-12-19 PROCEDURE — 3074F SYST BP LT 130 MM HG: CPT | Performed by: FAMILY MEDICINE

## 2022-12-19 PROCEDURE — G8484 FLU IMMUNIZE NO ADMIN: HCPCS | Performed by: FAMILY MEDICINE

## 2022-12-19 ASSESSMENT — ENCOUNTER SYMPTOMS
ABDOMINAL PAIN: 0
NAUSEA: 0
SHORTNESS OF BREATH: 0
DIARRHEA: 0
CONSTIPATION: 0

## 2022-12-19 NOTE — PROGRESS NOTES
Well Adult Note  Name: Roel Stubbs Date: 2022   MRN: [id number] Sex: Male   Age: 47 y.o. Ethnicity: Non- / Non    : 1970 Race: Aldona Cornelia / African American      Jacksonbell Connors is here for well adult exam.  History:  Chief Complaint   Patient presents with    Annual Exam   Sometimes feels a tightness in his left upper chest which comes and goes. Feels like a fluttering. Used to see Dr. Odessa Le. Otherwise feels well w/ intermittent neuropathy pain which is chronic. Takes gabapentin prn 200mg up to 4 times daily but states it takes a long time for it to kick in. Review of Systems   Constitutional:  Negative for fatigue and unexpected weight change. HENT:  Negative for congestion. Eyes:  Negative for visual disturbance. Respiratory:  Negative for shortness of breath. Cardiovascular:  Negative for chest pain, palpitations and leg swelling. Gastrointestinal:  Negative for abdominal pain, constipation, diarrhea and nausea. Genitourinary:  Negative for dysuria. Skin:  Negative for rash. Neurological:  Negative for light-headedness. Psychiatric/Behavioral:  Negative for dysphoric mood. No Known Allergies      Prior to Visit Medications    Medication Sig Taking? Authorizing Provider   ibuprofen (ADVIL;MOTRIN) 800 MG tablet Take 1 tablet by mouth every 8 hours as needed for Pain Yes Jh Slater MD   gabapentin (NEURONTIN) 100 MG capsule Take 3 capsules by mouth 3 times daily for 180 days. Take 1 cap in the morning and 2 caps at night for 1-2 weeks. May increase to 1-2 caps in the morning and 2-3 caps at night.  Yes Jh Slater MD   atorvastatin (LIPITOR) 40 MG tablet Take 1 tablet by mouth daily Yes Jh Slater MD   amLODIPine (NORVASC) 10 MG tablet Take 1 tablet by mouth daily Yes Jh Slater MD   metFORMIN (GLUCOPHAGE) 500 MG tablet Take 1 tablet by mouth 2 times daily (with meals) Yes Shay Mancera MD   pantoprazole (PROTONIX) 40 MG tablet Take 1 tablet by mouth daily Yes Shay Mancera MD   tadalafil (CIALIS) 10 MG tablet Take 1 tablet by mouth as needed for Erectile Dysfunction Yes Shay Mancera MD   cyclobenzaprine (FLEXERIL) 5 MG tablet Take 5 mg by mouth 3 times daily as needed Yes Ar Automatic Reconciliation         Past Medical History:   Diagnosis Date    Anxiety state, unspecified     Back pain     Chronic pain     back pain radiating down his LLE    COVID-19 vaccine series completed     Moderna: second dose 5/5/21- bring card DOS    Depressive disorder, not elsewhere classified     Diabetes ([facility] [address] dx 2013    Type 2, oral med, unsure last HA1C, never hypo, does not use glucometer    Dysmetabolic syndrome X 1/9/08    HgbA1C 6.4    Foot drop, left     GERD (gastroesophageal reflux disease)     pantoprazole    Hypercholesterolemia      on 4/1/13    Hypertension     controlled with med    Impotence of organic origin     Insomnia, unspecified 7/6/2015    Thoracic or lumbosacral neuritis or radiculitis, unspecified     Tobacco use disorder        Past Surgical History:   Procedure Laterality Date    BREAST SURGERY Left     cyst removed    COLONOSCOPY N/A 6/16/2021    COLONOSCOPY/BMI 30 performed by Lorenza Sheldon MD at [facility] ENDOSCOPY    NEUROLOGICAL SURGERY      herniated disc, discectomy by Dr Jo Ann Watson 11/05    ORTHOPEDIC SURGERY Left 9/24/13    Tendon repair to treat foot drop in left ankle by Dr. Yasmin Quiroz Left 12/2012    ORIF Left Ankle by Dr. Suzan Enriquez         Family History   Problem Relation Age of Onset    Gout Brother     Breast Cancer Mother     Diabetes Father     Hypertension Brother     Cancer Mother         breast    Hypertension Mother     Lupus Son 23       Social History     Tobacco Use    Smoking status: Some Days     Packs/day: 0.25     Types: Cigarettes    Smokeless tobacco: Former     Quit date: 1/1/2016    Tobacco comments: Quit smoking: occasionally; states stopped dipping   Substance Use Topics    Alcohol use: Yes    Drug use: Yes     Types: Prescription, Marijuana (Weed)       Objective   BP (!) 129/90 (Site: Right Upper Arm, Position: Sitting, Cuff Size: Large Adult)   Pulse 76   Temp 96.8 °F (36 °C) (Temporal)   Ht 5' 8\" (1.727 m)   Wt 191 lb 12.8 oz (87 kg)   SpO2 98%   BMI 29.16 kg/m²   Wt Readings from Last 3 Encounters:   12/19/22 191 lb 12.8 oz (87 kg)   10/20/22 192 lb (87.1 kg)   08/16/22 185 lb (83.9 kg)     There were no vitals filed for this visit. Physical Exam  Vitals reviewed. Constitutional:       Appearance: Normal appearance. HENT:      Head: Normocephalic and atraumatic. Nose: Nose normal.      Mouth/Throat:      Mouth: Mucous membranes are moist.      Pharynx: Oropharynx is clear. Eyes:      Extraocular Movements: Extraocular movements intact. Conjunctiva/sclera: Conjunctivae normal.      Pupils: Pupils are equal, round, and reactive to light. Cardiovascular:      Rate and Rhythm: Normal rate and regular rhythm. Pulses: Normal pulses. Heart sounds: Normal heart sounds. Pulmonary:      Effort: Pulmonary effort is normal.      Breath sounds: Normal breath sounds. Abdominal:      General: Abdomen is flat. Bowel sounds are normal.      Palpations: Abdomen is soft. Musculoskeletal:         General: Normal range of motion. Skin:     General: Skin is warm and dry. Neurological:      General: No focal deficit present. Mental Status: He is alert. Assessment   Plan   1. Encounter for well adult exam without abnormal findings  -     Comprehensive Metabolic Panel; Future  -     Hemoglobin A1C; Future  -     Lipid Panel; Future  -     PSA, Diagnostic; Future  -     CBC with Auto Differential; Future  2. Feeling of chest tightness   Needs shingles shot. He was told to go to pharmacy to get this asap.    Taking gabapentin as needed but feels it takes a long time to kick in. Personalized Preventive Plan   Current Health Maintenance Status  Immunization History   Administered Date(s) Administered    COVID-19, MODERNA BLUE border, Primary or Immunocompromised, (age 12y+), IM, 100 mcg/0.5mL 03/08/2021, 04/05/2021    Influenza Quadv 11/18/2014    Influenza, FLUARIX, FLULAVAL, FLUZONE (age 10 mo+) AND AFLURIA, (age 1 y+), PF, 0.5mL 03/09/2020    Tdap (Boostrix, Adacel) 01/08/2019        Health Maintenance   Topic Date Due    Pneumococcal 0-64 years Vaccine (1 - PCV) Never done    HIV screen  Never done    Hepatitis C screen  Never done    COVID-19 Vaccine (3 - Booster for Moderna series) 05/31/2021    Shingles vaccine (1 of 2) Never done    Flu vaccine (1) 08/01/2022    A1C test (Diabetic or Prediabetic)  12/27/2022    Lipids  12/27/2022    Depression Screen  05/23/2023    DTaP/Tdap/Td vaccine (2 - Td or Tdap) 01/08/2029    Colorectal Cancer Screen  05/23/2031    Hepatitis A vaccine  Aged Out    Hib vaccine  Aged Out    Meningococcal (ACWY) vaccine  Aged Out     Recommendations for [place] Due: see orders and patient instructions/AVS.    No follow-ups on file.

## 2022-12-19 NOTE — PATIENT INSTRUCTIONS
Well Visit, Men 48 to 72: Care Instructions  Overview     Well visits can help you stay healthy. Your doctor has checked your overall health and may have suggested ways to take good care of yourself. Your doctor also may have recommended tests. At home, you can help prevent illness with healthy eating, regular exercise, and other steps. Follow-up care is a key part of your treatment and safety. Be sure to make and go to all appointments, and call your doctor if you are having problems. It's also a good idea to know your test results and keep a list of the medicines you take. How can you care for yourself at home? Get screening tests that you and your doctor decide on. Screening helps find diseases before any symptoms appear. Eat healthy foods. Choose fruits, vegetables, whole grains, protein, and low-fat dairy foods. Limit fat, especially saturated fat. Reduce salt in your diet. Limit alcohol. Have no more than 2 drinks a day or 14 drinks a week. Get at least 30 minutes of exercise on most days of the week. Walking is a good choice. You also may want to do other activities, such as running, swimming, cycling, or playing tennis or team sports. Reach and stay at a healthy weight. This will lower your risk for many problems, such as obesity, diabetes, heart disease, and high blood pressure. Do not smoke. Smoking can make health problems worse. If you need help quitting, talk to your doctor about stop-smoking programs and medicines. These can increase your chances of quitting for good. Care for your mental health. It is easy to get weighed down by worry and stress. Learn strategies to manage stress, like deep breathing and mindfulness, and stay connected with your family and community. If you find you often feel sad or hopeless, talk with your doctor. Treatment can help. Talk to your doctor about whether you have any risk factors for sexually transmitted infections (STIs).  You can help prevent STIs if you wait to have sex with a new partner (or partners) until you've each been tested for STIs. It also helps if you use condoms (male or female condoms) and if you limit your sex partners to one person who only has sex with you. Vaccines are available for some STIs. If it's important to you to prevent pregnancy with your partner, talk with your doctor about birth control options that might be best for you. If you think you may have a problem with alcohol or drug use, talk to your doctor. This includes prescription medicines (such as amphetamines and opioids) and illegal drugs (such as cocaine and methamphetamine). Your doctor can help you figure out what type of treatment is best for you. Protect your skin from too much sun. When you're outdoors from 10 a.m. to 4 p.m., stay in the shade or cover up with clothing and a hat with a wide brim. Wear sunglasses that block UV rays. Even when it's cloudy, put broad-spectrum sunscreen (SPF 30 or higher) on any exposed skin. See a dentist one or two times a year for checkups and to have your teeth cleaned. Wear a seat belt in the car. When should you call for help? Watch closely for changes in your health, and be sure to contact your doctor if you have any problems or symptoms that concern you. Where can you learn more? Go to http://www.woods.com/ and enter K916 to learn more about \"Well Visit, Men 48 to 72: Care Instructions. \"  Current as of: June 6, 2022               Content Version: 13.5  © 2006-2022 [employer]. Care instructions adapted under license by [facility] ([facility]). If you have questions about a medical condition or this instruction, always ask your healthcare professional. [place] [address] any warranty or liability for your use of this information.

## 2022-12-20 LAB
ALBUMIN SERPL-MCNC: 4.2 G/DL (ref 3.5–5)
ALBUMIN/GLOB SERPL: 1.2 (ref 0.4–1.6)
ALP SERPL-CCNC: 86 U/L (ref 50–136)
ALT SERPL-CCNC: 65 U/L (ref 12–65)
ANION GAP SERPL CALC-SCNC: 6 MMOL/L (ref 2–11)
AST SERPL-CCNC: 41 U/L (ref 15–37)
BILIRUB SERPL-MCNC: 0.4 MG/DL (ref 0.2–1.1)
BUN SERPL-MCNC: 18 MG/DL (ref 6–23)
CALCIUM SERPL-MCNC: 9.6 MG/DL (ref 8.3–10.4)
CHLORIDE SERPL-SCNC: 107 MMOL/L (ref 101–110)
CHOLEST SERPL-MCNC: 129 MG/DL
CO2 SERPL-SCNC: 26 MMOL/L (ref 21–32)
CREAT SERPL-MCNC: 1.1 MG/DL (ref 0.8–1.5)
GLOBULIN SER CALC-MCNC: 3.4 G/DL (ref 2.8–4.5)
GLUCOSE SERPL-MCNC: 91 MG/DL (ref 65–100)
HDLC SERPL-MCNC: 59 MG/DL (ref 40–60)
HDLC SERPL: 2.2
LDLC SERPL CALC-MCNC: 57.2 MG/DL
POTASSIUM SERPL-SCNC: 3.8 MMOL/L (ref 3.5–5.1)
PROT SERPL-MCNC: 7.6 G/DL (ref 6.3–8.2)
PSA SERPL-MCNC: 1.8 NG/ML
SODIUM SERPL-SCNC: 139 MMOL/L (ref 133–143)
TRIGL SERPL-MCNC: 64 MG/DL (ref 35–150)
VLDLC SERPL CALC-MCNC: 12.8 MG/DL (ref 6–23)

## 2023-02-04 ASSESSMENT — ENCOUNTER SYMPTOMS
PHOTOPHOBIA: 0
SHORTNESS OF BREATH: 0
NAUSEA: 0
ABDOMINAL PAIN: 0
ABDOMINAL DISTENTION: 0
COUGH: 0
DIARRHEA: 0
CONSTIPATION: 0
VOMITING: 0
WHEEZING: 0

## 2023-02-04 NOTE — PROGRESS NOTES
[facility] CARDIOLOGY  [address], [address]  PHONE: [phone]        NAME:  Yg Jon  : 1970  MRN: [id number]     PCP:  Martha Linder MD    SUBJECTIVE:   Yg Jon is a 47 y.o. male seen for a consultation visit regarding the following:     Chief Complaint   Patient presents with    Consultation    Chest Pain        HPI:     Referred by Dr. Brian Bernard to discuss intermittent left upper chest tightness and intermittent palpitations but no prolonged tachycardia. No esperanza angina, NOVOA, SOB, presyncope or syncope. Multiple cardiac risk factors, compliant with meds. Not really exercising but no symptoms with ADL's. He is currently smoking on a daily basis and counseled extensively today regarding the benefits of cessation. His father had coronary disease and heart failure  in his 76s with a defibrillator in place. Exam and ECG are fairly benign today. Past Medical History, Past Surgical History, Family history, Social History, and Medications were all reviewed with the patient today and updated as necessary. Current Outpatient Medications   Medication Sig Dispense Refill    ibuprofen (ADVIL;MOTRIN) 800 MG tablet Take 1 tablet by mouth every 8 hours as needed for Pain 120 tablet 2    gabapentin (NEURONTIN) 100 MG capsule Take 3 capsules by mouth 3 times daily for 180 days. Take 1 cap in the morning and 2 caps at night for 1-2 weeks. May increase to 1-2 caps in the morning and 2-3 caps at night.  270 capsule 5    atorvastatin (LIPITOR) 40 MG tablet Take 1 tablet by mouth daily 90 tablet 1    amLODIPine (NORVASC) 10 MG tablet Take 1 tablet by mouth daily 90 tablet 1    metFORMIN (GLUCOPHAGE) 500 MG tablet Take 1 tablet by mouth 2 times daily (with meals) 180 tablet 1    cyclobenzaprine (FLEXERIL) 5 MG tablet Take 5 mg by mouth 3 times daily as needed      pantoprazole (PROTONIX) 40 MG tablet Take 1 tablet by mouth daily (Patient not taking: Reported on 2/6/2023) 90 tablet 1    tadalafil (CIALIS) 10 MG tablet Take 1 tablet by mouth as needed for Erectile Dysfunction (Patient not taking: Reported on 2/6/2023) 90 tablet 1     Current Facility-Administered Medications   Medication Dose Route Frequency Provider Last Rate Last Admin    methylPREDNISolone acetate (DEPO-MEDROL) injection 80 mg  80 mg IntraMUSCular Once Sushil Mello MD            No Known Allergies    Patient Active Problem List    Diagnosis Date Noted    Other chest pain 08/29/2018    Anxiety 05/11/2018    Shoulder impingement syndrome, right 05/11/2018    Pure hypercholesterolemia     Chronic low back pain with bilateral sciatica     Chronic bilateral low back pain with bilateral sciatica 09/13/2017     Overview Note:     h/o 2 back surgeries(Dr. Kvng Lyons) last one being 2013/14. h/o epidural INJ   (2013/14) - caused left foot drop        Chronic radicular low back pain     H/O diagnostic tests 04/01/2017     Overview Note:     Heart cath: 3/2017 - minimal dz        Unstable angina (HCC) 03/29/2017    Shortness of breath 03/29/2017    Hypertension     GERD (gastroesophageal reflux disease) 02/16/2016    Chronic pain of left ankle 09/07/2015    Insomnia, unspecified 07/06/2015    Foot drop, left     Tobacco use disorder     Impotence of organic origin     Prediabetes 04/01/2013     Overview Note:     HgbA1C 6.4           Past Surgical History:   Procedure Laterality Date    BREAST SURGERY Left     cyst removed    COLONOSCOPY N/A 6/16/2021    COLONOSCOPY/BMI 30 performed by Kim Rodriguez MD at [facility] ENDOSCOPY    NEUROLOGICAL SURGERY      herniated disc, discectomy by Dr Pancho Cleveland 11/05    ORTHOPEDIC SURGERY Left 9/24/13    Tendon repair to treat foot drop in left ankle by Dr. Marin Chávez Left 12/2012    ORIF Left Ankle by Dr. Alexander Ruiz       Family History   Problem Relation Age of Onset    Gout Brother     Breast Cancer Mother     Diabetes- CHF with ICD,  ~76's Father     Hypertension Brother     Cancer Mother         breast    Hypertension Mother     Lupus Son 23       Social History     Tobacco Use    Smoking status: Some Days     Packs/day: 0.25     Types: Cigarettes    Smokeless tobacco: Former     Quit date: 2016    Tobacco comments:     Quit smoking: occasionally; states stopped dipping   Substance Use Topics    Alcohol use: Yes       ROS:    Review of Systems   Constitutional:  Negative for chills, fatigue, fever and unexpected weight change. HENT:  Negative for congestion, nosebleeds and tinnitus. Eyes:  Negative for photophobia and visual disturbance. Respiratory:  Positive for chest tightness. Negative for cough, shortness of breath and wheezing. Cardiovascular:  Positive for palpitations. Negative for chest pain and leg swelling. Gastrointestinal:  Negative for abdominal distention, abdominal pain, constipation, diarrhea, nausea and vomiting. Endocrine: Negative for cold intolerance and heat intolerance. Genitourinary:  Negative for dysuria, frequency and hematuria. Musculoskeletal:  Negative for arthralgias, joint swelling and myalgias. Skin:  Negative for rash and wound. Allergic/Immunologic: Negative for environmental allergies. Neurological:  Negative for dizziness, seizures, syncope and light-headedness. Hematological:  Negative for adenopathy. Does not bruise/bleed easily. Psychiatric/Behavioral:  Negative for agitation, behavioral problems, confusion and hallucinations.         PHYSICAL EXAM:  Vitals:    23 0829   BP: 130/86   Pulse: 54   Weight: 186 lb 6.4 oz (84.6 kg)   Height: 5' 8\" (1.727 m)      Wt Readings from Last 3 Encounters:   23 186 lb 6.4 oz (84.6 kg)   22 191 lb 12.8 oz (87 kg)   10/20/22 192 lb (87.1 kg)      BP Readings from Last 3 Encounters:   23 130/86   22 (!) 129/90   10/20/22 137/84        Physical Exam  Constitutional:       Appearance: Normal appearance. HENT:      Head: Normocephalic and atraumatic. Nose: Nose normal.      Mouth/Throat:      Mouth: Mucous membranes are moist.      Pharynx: Oropharynx is clear. Eyes:      Extraocular Movements: Extraocular movements intact. Pupils: Pupils are equal, round, and reactive to light. Neck:      Vascular: No carotid bruit or JVD. Cardiovascular:      Rate and Rhythm: Normal rate and regular rhythm. Heart sounds: No murmur heard. No friction rub. No gallop. Pulmonary:      Effort: Pulmonary effort is normal.      Breath sounds: Normal breath sounds. No wheezing or rhonchi. Abdominal:      General: Abdomen is flat. Bowel sounds are normal. There is no distension. Palpations: Abdomen is soft. Tenderness: There is no abdominal tenderness. Musculoskeletal:         General: Normal range of motion. Cervical back: Normal range of motion and neck supple. No tenderness. Skin:     General: Skin is warm and dry. Neurological:      General: No focal deficit present. Mental Status: He is alert and oriented to person, place, and time. Psychiatric:         Mood and Affect: Mood normal.         Behavior: Behavior normal.        Medical problems and test results were reviewed with the patient today.         Lab Results   Component Value Date    CHOL 129 12/19/2022    CHOL 124 12/27/2021    CHOL 108 03/12/2021     Lab Results   Component Value Date    TRIG 64 12/19/2022    TRIG 50 12/27/2021    TRIG 51 03/12/2021     Lab Results   Component Value Date    HDL 59 12/19/2022    HDL 43 12/27/2021    HDL 44 03/12/2021     Lab Results   Component Value Date    LDLCALC 57.2 12/19/2022    LDLCALC 69 12/27/2021    LDLCALC 52 03/12/2021     Lab Results   Component Value Date    LABVLDL 12.8 12/19/2022    LABVLDL 11 08/28/2020    LABVLDL 11 12/09/2019    VLDL 12 12/27/2021    VLDL 12 03/12/2021     Lab Results   Component Value Date    CHOLHDLRATIO 2.2 12/19/2022        Lab Results Component Value Date/Time     12/19/2022 04:03 PM    K 3.8 12/19/2022 04:03 PM     12/19/2022 04:03 PM    CO2 26 12/19/2022 04:03 PM    BUN 18 12/19/2022 04:03 PM    CREATININE 1.10 12/19/2022 04:03 PM    GLUCOSE 91 12/19/2022 04:03 PM    CALCIUM 9.6 12/19/2022 04:03 PM         Lab Results   Component Value Date    WBC 6.6 12/19/2022    HGB 14.3 12/19/2022    HCT 44.9 12/19/2022    MCV 84.4 12/19/2022     (L) 12/19/2022        No results found for: TSHFT4, TSH     Lab Results   Component Value Date    LABA1C 6.1 (H) 12/19/2022       Results for orders placed or performed in visit on 02/06/23   EKG 12 lead    Impression    Sinus  Bradycardia 54 bpm  Normal axis and intervals  Inferior minimal nonspecific ST-T wave changes  WITHIN NORMAL LIMITS        ASSESSMENT and PLAN    Jackson was seen today for consultation and chest pain. Diagnoses and all orders for this visit:    Chest discomfort-multiple cardiac risk factors but discomfort in his chest to his left upper chest and exacerbated by stooping over and pulling objects up. Most likely musculoskeletal.  Regardless he was to be aggressive with his medical care and has numerous cardiac risk factors as noted above. ECG mildly abnormal inferiorly with regards ST and T waves. Check exercise nuclear stress test and echo for baseline. Augment exercise regimen/lifestyle modification at follow-up. Primary hypertension-stable, continue meds, counseled regarding benefits of low-fat low-sodium diet and regular cardiovascular exercise. We will wait to start exercise regimen until after noninvasive imaging    Gastroesophageal reflux disease without esophagitis-continue meds with PCP surveillance    Pure hypercholesterolemia-continue meds with PCP surveillance    Tobacco use disorder-  - the importance of smoking cessation discussed for ~3 min with the patient today.   The patient understands the benefits of cessation and the risks of continued smoking. Palpitations-brief, short-lived, sporadic with no accelerating/exacerbating/alleviating factors. Sound benign at present. Follow clinically. Telemetry monitor if calls with worsening in the future. Return in about 4 weeks (around 3/6/2023).          Sanjiv León MD  02/06/23  8:44 AM

## 2023-02-06 ENCOUNTER — INITIAL CONSULT (OUTPATIENT)
Dept: CARDIOLOGY CLINIC | Age: 53
End: 2023-02-06
Payer: COMMERCIAL

## 2023-02-06 VITALS
HEIGHT: 68 IN | WEIGHT: 186.4 LBS | SYSTOLIC BLOOD PRESSURE: 130 MMHG | DIASTOLIC BLOOD PRESSURE: 86 MMHG | HEART RATE: 54 BPM | BODY MASS INDEX: 28.25 KG/M2

## 2023-02-06 DIAGNOSIS — Z76.89 ENCOUNTER TO ESTABLISH CARE: ICD-10-CM

## 2023-02-06 DIAGNOSIS — R07.89 CHEST DISCOMFORT: Primary | ICD-10-CM

## 2023-02-06 DIAGNOSIS — E78.00 PURE HYPERCHOLESTEROLEMIA: ICD-10-CM

## 2023-02-06 DIAGNOSIS — K21.9 GASTROESOPHAGEAL REFLUX DISEASE WITHOUT ESOPHAGITIS: ICD-10-CM

## 2023-02-06 DIAGNOSIS — F17.200 TOBACCO USE DISORDER: ICD-10-CM

## 2023-02-06 DIAGNOSIS — I10 PRIMARY HYPERTENSION: ICD-10-CM

## 2023-02-06 PROCEDURE — G8484 FLU IMMUNIZE NO ADMIN: HCPCS | Performed by: INTERNAL MEDICINE

## 2023-02-06 PROCEDURE — 99205 OFFICE O/P NEW HI 60 MIN: CPT | Performed by: INTERNAL MEDICINE

## 2023-02-06 PROCEDURE — 3075F SYST BP GE 130 - 139MM HG: CPT | Performed by: INTERNAL MEDICINE

## 2023-02-06 PROCEDURE — G8427 DOCREV CUR MEDS BY ELIG CLIN: HCPCS | Performed by: INTERNAL MEDICINE

## 2023-02-06 PROCEDURE — 93000 ELECTROCARDIOGRAM COMPLETE: CPT | Performed by: INTERNAL MEDICINE

## 2023-02-06 PROCEDURE — 3079F DIAST BP 80-89 MM HG: CPT | Performed by: INTERNAL MEDICINE

## 2023-02-06 PROCEDURE — G8419 CALC BMI OUT NRM PARAM NOF/U: HCPCS | Performed by: INTERNAL MEDICINE

## 2023-02-06 ASSESSMENT — ENCOUNTER SYMPTOMS: CHEST TIGHTNESS: 1

## 2023-02-21 ENCOUNTER — TELEPHONE (OUTPATIENT)
Dept: FAMILY MEDICINE CLINIC | Facility: CLINIC | Age: 53
End: 2023-02-21

## 2023-02-21 DIAGNOSIS — M54.16 CHRONIC RADICULAR LOW BACK PAIN: Primary | ICD-10-CM

## 2023-02-21 DIAGNOSIS — G89.29 CHRONIC RADICULAR LOW BACK PAIN: Primary | ICD-10-CM

## 2023-02-21 NOTE — TELEPHONE ENCOUNTER
----- Message from [address] sent at 2/21/2023 12:17 PM EST -----  Subject: Message to Provider    QUESTIONS  Information for Provider? Pt is having Sciatica pain and would like to   have a Cortisone shot.   ---------------------------------------------------------------------------  --------------  Love ALVAREZ  [phone]; OK to leave message on voicemail  ---------------------------------------------------------------------------  --------------  SCRIPT ANSWERS  Relationship to Patient?  Self

## 2023-02-22 RX ORDER — PREDNISONE 20 MG/1
40 TABLET ORAL DAILY
Qty: 10 TABLET | Refills: 0 | Status: SHIPPED | OUTPATIENT
Start: 2023-02-22 | End: 2023-02-27

## 2023-02-22 NOTE — TELEPHONE ENCOUNTER
Scheduled pt for March 16 due to there not being anything sooner. Pt is requesting to see if he can have something for the discomfort temporarily until his appt.

## 2023-03-16 ENCOUNTER — OFFICE VISIT (OUTPATIENT)
Dept: FAMILY MEDICINE CLINIC | Facility: CLINIC | Age: 53
End: 2023-03-16
Payer: COMMERCIAL

## 2023-03-16 VITALS
WEIGHT: 191 LBS | SYSTOLIC BLOOD PRESSURE: 120 MMHG | OXYGEN SATURATION: 99 % | DIASTOLIC BLOOD PRESSURE: 76 MMHG | RESPIRATION RATE: 19 BRPM | HEIGHT: 68 IN | TEMPERATURE: 96.9 F | BODY MASS INDEX: 28.95 KG/M2 | HEART RATE: 74 BPM

## 2023-03-16 DIAGNOSIS — M54.16 CHRONIC RADICULAR LOW BACK PAIN: ICD-10-CM

## 2023-03-16 DIAGNOSIS — R73.03 PREDIABETES: ICD-10-CM

## 2023-03-16 DIAGNOSIS — I10 ESSENTIAL (PRIMARY) HYPERTENSION: Primary | ICD-10-CM

## 2023-03-16 DIAGNOSIS — E78.00 PURE HYPERCHOLESTEROLEMIA, UNSPECIFIED: ICD-10-CM

## 2023-03-16 DIAGNOSIS — K21.9 GASTRO-ESOPHAGEAL REFLUX DISEASE WITHOUT ESOPHAGITIS: ICD-10-CM

## 2023-03-16 DIAGNOSIS — G89.29 CHRONIC RADICULAR LOW BACK PAIN: ICD-10-CM

## 2023-03-16 PROCEDURE — 3017F COLORECTAL CA SCREEN DOC REV: CPT | Performed by: FAMILY MEDICINE

## 2023-03-16 PROCEDURE — 4004F PT TOBACCO SCREEN RCVD TLK: CPT | Performed by: FAMILY MEDICINE

## 2023-03-16 PROCEDURE — 3074F SYST BP LT 130 MM HG: CPT | Performed by: FAMILY MEDICINE

## 2023-03-16 PROCEDURE — 3079F DIAST BP 80-89 MM HG: CPT | Performed by: FAMILY MEDICINE

## 2023-03-16 PROCEDURE — G8427 DOCREV CUR MEDS BY ELIG CLIN: HCPCS | Performed by: FAMILY MEDICINE

## 2023-03-16 PROCEDURE — 99214 OFFICE O/P EST MOD 30 MIN: CPT | Performed by: FAMILY MEDICINE

## 2023-03-16 PROCEDURE — G8419 CALC BMI OUT NRM PARAM NOF/U: HCPCS | Performed by: FAMILY MEDICINE

## 2023-03-16 PROCEDURE — G8484 FLU IMMUNIZE NO ADMIN: HCPCS | Performed by: FAMILY MEDICINE

## 2023-03-16 RX ORDER — ATORVASTATIN CALCIUM 40 MG/1
40 TABLET, FILM COATED ORAL DAILY
Qty: 90 TABLET | Refills: 1 | Status: SHIPPED | OUTPATIENT
Start: 2023-03-16

## 2023-03-16 RX ORDER — AMLODIPINE BESYLATE 10 MG/1
10 TABLET ORAL DAILY
Qty: 90 TABLET | Refills: 1 | Status: SHIPPED | OUTPATIENT
Start: 2023-03-16

## 2023-03-16 RX ORDER — PANTOPRAZOLE SODIUM 40 MG/1
40 TABLET, DELAYED RELEASE ORAL DAILY
Qty: 90 TABLET | Refills: 1 | Status: SHIPPED | OUTPATIENT
Start: 2023-03-16

## 2023-03-16 RX ORDER — METHYLPREDNISOLONE ACETATE 80 MG/ML
80 INJECTION, SUSPENSION INTRA-ARTICULAR; INTRALESIONAL; INTRAMUSCULAR; SOFT TISSUE ONCE
Status: SHIPPED | OUTPATIENT
Start: 2023-03-16

## 2023-03-16 SDOH — ECONOMIC STABILITY: FOOD INSECURITY: WITHIN THE PAST 12 MONTHS, YOU WORRIED THAT YOUR FOOD WOULD RUN OUT BEFORE YOU GOT MONEY TO BUY MORE.: NEVER TRUE

## 2023-03-16 SDOH — ECONOMIC STABILITY: INCOME INSECURITY: HOW HARD IS IT FOR YOU TO PAY FOR THE VERY BASICS LIKE FOOD, HOUSING, MEDICAL CARE, AND HEATING?: NOT HARD AT ALL

## 2023-03-16 SDOH — ECONOMIC STABILITY: HOUSING INSECURITY
IN THE LAST 12 MONTHS, WAS THERE A TIME WHEN YOU DID NOT HAVE A STEADY PLACE TO SLEEP OR SLEPT IN A SHELTER (INCLUDING NOW)?: NO

## 2023-03-16 SDOH — ECONOMIC STABILITY: FOOD INSECURITY: WITHIN THE PAST 12 MONTHS, THE FOOD YOU BOUGHT JUST DIDN'T LAST AND YOU DIDN'T HAVE MONEY TO GET MORE.: NEVER TRUE

## 2023-03-16 ASSESSMENT — PATIENT HEALTH QUESTIONNAIRE - PHQ9
SUM OF ALL RESPONSES TO PHQ QUESTIONS 1-9: 0
1. LITTLE INTEREST OR PLEASURE IN DOING THINGS: 0
SUM OF ALL RESPONSES TO PHQ9 QUESTIONS 1 & 2: 0
SUM OF ALL RESPONSES TO PHQ QUESTIONS 1-9: 0
2. FEELING DOWN, DEPRESSED OR HOPELESS: 0
SUM OF ALL RESPONSES TO PHQ QUESTIONS 1-9: 0
SUM OF ALL RESPONSES TO PHQ QUESTIONS 1-9: 0

## 2023-03-16 ASSESSMENT — ENCOUNTER SYMPTOMS
BACK PAIN: 1
DIARRHEA: 0
SHORTNESS OF BREATH: 0
CONSTIPATION: 0
NAUSEA: 0
ABDOMINAL PAIN: 0

## 2023-03-16 NOTE — PROGRESS NOTES
adverse effects. No red flag sx's. F/U HTN - BP well controlled. Compliant w/ med(s). Denies s/s target organ damage. Went to cardiology 2/2 abnormal ekg. He has a stress test and echo scheduled for 3/22/23      Review of Systems   Constitutional:  Negative for fatigue and unexpected weight change. HENT:  Negative for congestion. Eyes:  Negative for visual disturbance. Respiratory:  Negative for shortness of breath. Cardiovascular:  Negative for chest pain, palpitations and leg swelling. Gastrointestinal:  Negative for abdominal pain, constipation, diarrhea and nausea. Genitourinary:  Negative for dysuria. Musculoskeletal:  Positive for back pain and myalgias. Skin:  Negative for rash. Neurological:  Negative for light-headedness. Psychiatric/Behavioral:  Negative for dysphoric mood. Objective     Physical Exam  Vitals reviewed. Constitutional:       Appearance: Normal appearance. HENT:      Head: Normocephalic and atraumatic. Nose: Nose normal.      Mouth/Throat:      Mouth: Mucous membranes are moist.      Pharynx: Oropharynx is clear. Eyes:      Extraocular Movements: Extraocular movements intact. Conjunctiva/sclera: Conjunctivae normal.      Pupils: Pupils are equal, round, and reactive to light. Cardiovascular:      Rate and Rhythm: Normal rate and regular rhythm. Pulses: Normal pulses. Heart sounds: Normal heart sounds. Pulmonary:      Effort: Pulmonary effort is normal.      Breath sounds: Normal breath sounds. Abdominal:      General: Abdomen is flat. Bowel sounds are normal.      Palpations: Abdomen is soft. Musculoskeletal:         General: Normal range of motion. Lumbar back: Spasms and tenderness present. No swelling or deformity. Normal range of motion. Back:       Comments: Area of pain and spasm. Skin:     General: Skin is warm and dry. Neurological:      General: No focal deficit present.       Mental Status: He is

## 2023-03-18 ASSESSMENT — ENCOUNTER SYMPTOMS
CONSTIPATION: 0
DIARRHEA: 0
SHORTNESS OF BREATH: 0
PHOTOPHOBIA: 0
NAUSEA: 0
VOMITING: 0
ABDOMINAL DISTENTION: 0
COUGH: 0
CHEST TIGHTNESS: 1
WHEEZING: 0
ABDOMINAL PAIN: 0

## 2023-03-18 NOTE — PROGRESS NOTES
Component Value Date/Time     12/19/2022 04:03 PM    K 3.8 12/19/2022 04:03 PM     12/19/2022 04:03 PM    CO2 26 12/19/2022 04:03 PM    BUN 18 12/19/2022 04:03 PM    CREATININE 1.10 12/19/2022 04:03 PM    GLUCOSE 91 12/19/2022 04:03 PM    CALCIUM 9.6 12/19/2022 04:03 PM         Lab Results   Component Value Date    WBC 6.6 12/19/2022    HGB 14.3 12/19/2022    HCT 44.9 12/19/2022    MCV 84.4 12/19/2022     (L) 12/19/2022        No results found for: TSHFT4, TSH     Lab Results   Component Value Date    LABA1C 6.1 (H) 12/19/2022       No results found for any visits on 03/22/23. ASSESSMENT and PLAN    Jackson was seen today for consultation and chest pain. Diagnoses and all orders for this visit:    Chest discomfort-multiple cardiac risk factors but discomfort in his chest to his left upper chest and exacerbated by stooping over and pulling objects up. Most likely musculoskeletal.  Regardless he was to be aggressive with his medical care and has numerous cardiac risk factors as noted above. ECG on initial visit was mildly abnormal inferiorly with regards ST and T waves. Check exercise nuclear stress test and echo for baseline. Augment exercise regimen/lifestyle modification at follow-up. Primary hypertension-excellent, continue amlodipine at present dose, counseled regarding benefits of low-fat low-sodium diet and regular cardiovascular exercise. We will wait to start exercise regimen until after noninvasive imaging    Gastroesophageal reflux disease without esophagitis-continue meds with PCP surveillance    Pure hypercholesterolemia-continue atorvastatin 40 mg daily with PCP surveillance    Tobacco use disorder-still smoking but nearly stopped, about 1/day now, the importance of smoking cessation discussed for ~3 min with the patient today. The patient understands the benefits of cessation and the risks of continued smoking.      Palpitations-brief, short-lived, sporadic with no

## 2023-03-20 PROCEDURE — 96372 THER/PROPH/DIAG INJ SC/IM: CPT | Performed by: FAMILY MEDICINE

## 2023-03-20 RX ADMIN — METHYLPREDNISOLONE ACETATE 80 MG: 40 INJECTION, SUSPENSION INTRA-ARTICULAR; INTRALESIONAL; INTRAMUSCULAR; SOFT TISSUE at 16:51

## 2023-03-22 ENCOUNTER — OFFICE VISIT (OUTPATIENT)
Dept: CARDIOLOGY CLINIC | Age: 53
End: 2023-03-22
Payer: COMMERCIAL

## 2023-03-22 VITALS
BODY MASS INDEX: 27.74 KG/M2 | HEIGHT: 68 IN | DIASTOLIC BLOOD PRESSURE: 84 MMHG | WEIGHT: 183 LBS | SYSTOLIC BLOOD PRESSURE: 120 MMHG | HEART RATE: 64 BPM

## 2023-03-22 DIAGNOSIS — R06.02 SHORTNESS OF BREATH: ICD-10-CM

## 2023-03-22 DIAGNOSIS — E78.00 PURE HYPERCHOLESTEROLEMIA: ICD-10-CM

## 2023-03-22 DIAGNOSIS — F17.200 TOBACCO USE DISORDER: ICD-10-CM

## 2023-03-22 DIAGNOSIS — K21.9 GASTROESOPHAGEAL REFLUX DISEASE WITHOUT ESOPHAGITIS: ICD-10-CM

## 2023-03-22 DIAGNOSIS — R73.03 PREDIABETES: ICD-10-CM

## 2023-03-22 DIAGNOSIS — I10 PRIMARY HYPERTENSION: Primary | ICD-10-CM

## 2023-03-22 PROCEDURE — G8427 DOCREV CUR MEDS BY ELIG CLIN: HCPCS | Performed by: INTERNAL MEDICINE

## 2023-03-22 PROCEDURE — G8484 FLU IMMUNIZE NO ADMIN: HCPCS | Performed by: INTERNAL MEDICINE

## 2023-03-22 PROCEDURE — 99214 OFFICE O/P EST MOD 30 MIN: CPT | Performed by: INTERNAL MEDICINE

## 2023-03-22 PROCEDURE — G8419 CALC BMI OUT NRM PARAM NOF/U: HCPCS | Performed by: INTERNAL MEDICINE

## 2023-03-22 PROCEDURE — 3074F SYST BP LT 130 MM HG: CPT | Performed by: INTERNAL MEDICINE

## 2023-03-22 PROCEDURE — 4004F PT TOBACCO SCREEN RCVD TLK: CPT | Performed by: INTERNAL MEDICINE

## 2023-03-22 PROCEDURE — 3017F COLORECTAL CA SCREEN DOC REV: CPT | Performed by: INTERNAL MEDICINE

## 2023-03-22 PROCEDURE — 3079F DIAST BP 80-89 MM HG: CPT | Performed by: INTERNAL MEDICINE

## 2023-03-22 RX ORDER — METHYLPREDNISOLONE ACETATE 40 MG/ML
80 INJECTION, SUSPENSION INTRA-ARTICULAR; INTRALESIONAL; INTRAMUSCULAR; SOFT TISSUE ONCE
Status: COMPLETED | OUTPATIENT
Start: 2023-03-22 | End: 2023-03-20

## 2023-04-18 ASSESSMENT — ENCOUNTER SYMPTOMS
VOMITING: 0
ABDOMINAL DISTENTION: 0
NAUSEA: 0
PHOTOPHOBIA: 0
COUGH: 0
CONSTIPATION: 0
DIARRHEA: 0
SHORTNESS OF BREATH: 0
ABDOMINAL PAIN: 0
WHEEZING: 0

## 2023-04-19 NOTE — PROGRESS NOTES
sporadic with no accelerating/exacerbating/alleviating factors. Sound benign at present. Echo looks benign as noted above. Follow clinically. Telemetry monitor if calls with worsening in the future. Return in about 6 months (around 10/20/2023).          Annabelle Ahuja MD  04/20/23  8:07 AM

## 2023-04-20 ENCOUNTER — OFFICE VISIT (OUTPATIENT)
Dept: CARDIOLOGY CLINIC | Age: 53
End: 2023-04-20
Payer: COMMERCIAL

## 2023-04-20 VITALS
WEIGHT: 185.2 LBS | SYSTOLIC BLOOD PRESSURE: 138 MMHG | HEART RATE: 74 BPM | BODY MASS INDEX: 28.07 KG/M2 | DIASTOLIC BLOOD PRESSURE: 90 MMHG | HEIGHT: 68 IN

## 2023-04-20 DIAGNOSIS — I10 PRIMARY HYPERTENSION: Primary | ICD-10-CM

## 2023-04-20 DIAGNOSIS — F17.200 TOBACCO USE DISORDER: ICD-10-CM

## 2023-04-20 DIAGNOSIS — E78.00 PURE HYPERCHOLESTEROLEMIA: ICD-10-CM

## 2023-04-20 DIAGNOSIS — R06.02 SHORTNESS OF BREATH: ICD-10-CM

## 2023-04-20 PROCEDURE — 3017F COLORECTAL CA SCREEN DOC REV: CPT | Performed by: INTERNAL MEDICINE

## 2023-04-20 PROCEDURE — 3080F DIAST BP >= 90 MM HG: CPT | Performed by: INTERNAL MEDICINE

## 2023-04-20 PROCEDURE — 4004F PT TOBACCO SCREEN RCVD TLK: CPT | Performed by: INTERNAL MEDICINE

## 2023-04-20 PROCEDURE — 99214 OFFICE O/P EST MOD 30 MIN: CPT | Performed by: INTERNAL MEDICINE

## 2023-04-20 PROCEDURE — 3075F SYST BP GE 130 - 139MM HG: CPT | Performed by: INTERNAL MEDICINE

## 2023-04-20 PROCEDURE — G8419 CALC BMI OUT NRM PARAM NOF/U: HCPCS | Performed by: INTERNAL MEDICINE

## 2023-04-20 PROCEDURE — G8427 DOCREV CUR MEDS BY ELIG CLIN: HCPCS | Performed by: INTERNAL MEDICINE

## 2023-04-20 ASSESSMENT — ENCOUNTER SYMPTOMS: CHEST TIGHTNESS: 0

## 2023-09-18 ENCOUNTER — HOSPITAL ENCOUNTER (OUTPATIENT)
Dept: GENERAL RADIOLOGY | Age: 53
Discharge: HOME | End: 2023-09-21

## 2023-09-18 ENCOUNTER — OFFICE VISIT (OUTPATIENT)
Dept: FAMILY MEDICINE CLINIC | Facility: CLINIC | Age: 53
End: 2023-09-18
Payer: COMMERCIAL

## 2023-09-18 VITALS
BODY MASS INDEX: 28.79 KG/M2 | SYSTOLIC BLOOD PRESSURE: 112 MMHG | OXYGEN SATURATION: 97 % | HEART RATE: 70 BPM | RESPIRATION RATE: 19 BRPM | HEIGHT: 68 IN | TEMPERATURE: 98.1 F | WEIGHT: 190 LBS | DIASTOLIC BLOOD PRESSURE: 74 MMHG

## 2023-09-18 DIAGNOSIS — F41.1 GENERALIZED ANXIETY DISORDER: Primary | ICD-10-CM

## 2023-09-18 DIAGNOSIS — R73.03 PREDIABETES: ICD-10-CM

## 2023-09-18 DIAGNOSIS — M25.421 ELBOW SWELLING, RIGHT: ICD-10-CM

## 2023-09-18 DIAGNOSIS — G89.29 CHRONIC RADICULAR LOW BACK PAIN: ICD-10-CM

## 2023-09-18 DIAGNOSIS — F17.290 OTHER TOBACCO PRODUCT NICOTINE DEPENDENCE, UNCOMPLICATED: ICD-10-CM

## 2023-09-18 DIAGNOSIS — E78.00 PURE HYPERCHOLESTEROLEMIA, UNSPECIFIED: ICD-10-CM

## 2023-09-18 DIAGNOSIS — I10 ESSENTIAL (PRIMARY) HYPERTENSION: ICD-10-CM

## 2023-09-18 DIAGNOSIS — M54.16 CHRONIC RADICULAR LOW BACK PAIN: ICD-10-CM

## 2023-09-18 LAB
ALBUMIN SERPL-MCNC: 3.7 G/DL (ref 3.5–5)
ALBUMIN/GLOB SERPL: 0.9 (ref 0.4–1.6)
ALP SERPL-CCNC: 75 U/L (ref 50–136)
ALT SERPL-CCNC: 57 U/L (ref 12–65)
ANION GAP SERPL CALC-SCNC: 3 MMOL/L (ref 2–11)
AST SERPL-CCNC: 31 U/L (ref 15–37)
BILIRUB SERPL-MCNC: 0.2 MG/DL (ref 0.2–1.1)
BUN SERPL-MCNC: 12 MG/DL (ref 6–23)
CALCIUM SERPL-MCNC: 9.1 MG/DL (ref 8.3–10.4)
CHLORIDE SERPL-SCNC: 109 MMOL/L (ref 101–110)
CHOLEST SERPL-MCNC: 151 MG/DL
CO2 SERPL-SCNC: 29 MMOL/L (ref 21–32)
CREAT SERPL-MCNC: 1.3 MG/DL (ref 0.8–1.5)
GLOBULIN SER CALC-MCNC: 4.1 G/DL (ref 2.8–4.5)
GLUCOSE SERPL-MCNC: 98 MG/DL (ref 65–100)
HDLC SERPL-MCNC: 58 MG/DL (ref 40–60)
HDLC SERPL: 2.6
LDLC SERPL CALC-MCNC: 77.6 MG/DL
POTASSIUM SERPL-SCNC: 4 MMOL/L (ref 3.5–5.1)
PROT SERPL-MCNC: 7.8 G/DL (ref 6.3–8.2)
SODIUM SERPL-SCNC: 141 MMOL/L (ref 133–143)
TRIGL SERPL-MCNC: 77 MG/DL (ref 35–150)
VLDLC SERPL CALC-MCNC: 15.4 MG/DL (ref 6–23)

## 2023-09-18 PROCEDURE — 3074F SYST BP LT 130 MM HG: CPT | Performed by: FAMILY MEDICINE

## 2023-09-18 PROCEDURE — 3078F DIAST BP <80 MM HG: CPT | Performed by: FAMILY MEDICINE

## 2023-09-18 PROCEDURE — 99214 OFFICE O/P EST MOD 30 MIN: CPT | Performed by: FAMILY MEDICINE

## 2023-09-18 PROCEDURE — 3017F COLORECTAL CA SCREEN DOC REV: CPT | Performed by: FAMILY MEDICINE

## 2023-09-18 PROCEDURE — 4004F PT TOBACCO SCREEN RCVD TLK: CPT | Performed by: FAMILY MEDICINE

## 2023-09-18 PROCEDURE — G8419 CALC BMI OUT NRM PARAM NOF/U: HCPCS | Performed by: FAMILY MEDICINE

## 2023-09-18 PROCEDURE — G8427 DOCREV CUR MEDS BY ELIG CLIN: HCPCS | Performed by: FAMILY MEDICINE

## 2023-09-18 RX ORDER — VARENICLINE TARTRATE 0.5 MG/1
.5-1 TABLET, FILM COATED ORAL SEE ADMIN INSTRUCTIONS
Qty: 57 TABLET | Refills: 0 | Status: SHIPPED | OUTPATIENT
Start: 2023-09-18

## 2023-09-18 RX ORDER — CYCLOBENZAPRINE HCL 10 MG
10 TABLET ORAL NIGHTLY PRN
Qty: 10 TABLET | Refills: 0 | Status: SHIPPED | OUTPATIENT
Start: 2023-09-18 | End: 2023-09-28

## 2023-09-18 RX ORDER — ATORVASTATIN CALCIUM 40 MG/1
40 TABLET, FILM COATED ORAL DAILY
Qty: 90 TABLET | Refills: 1 | Status: SHIPPED | OUTPATIENT
Start: 2023-09-18

## 2023-09-18 RX ORDER — IBUPROFEN 800 MG/1
800 TABLET ORAL EVERY 8 HOURS PRN
Qty: 120 TABLET | Refills: 2 | Status: SHIPPED | OUTPATIENT
Start: 2023-09-18

## 2023-09-18 RX ORDER — AMLODIPINE BESYLATE 10 MG/1
10 TABLET ORAL DAILY
Qty: 90 TABLET | Refills: 1 | Status: SHIPPED | OUTPATIENT
Start: 2023-09-18

## 2023-09-18 RX ORDER — SERTRALINE HYDROCHLORIDE 25 MG/1
25 TABLET, FILM COATED ORAL DAILY
Qty: 30 TABLET | Refills: 1 | Status: SHIPPED | OUTPATIENT
Start: 2023-09-18

## 2023-09-18 ASSESSMENT — ENCOUNTER SYMPTOMS
ABDOMINAL PAIN: 0
NAUSEA: 0
CONSTIPATION: 0
SHORTNESS OF BREATH: 0
DIARRHEA: 0

## 2023-09-18 NOTE — PROGRESS NOTES
Jackson Bowers (:  1970) is a 47 y.o. male,Established patient, here for evaluation of the following chief complaint(s):  6 Month Follow-Up (On HTN, Cholesterol and Prediabetes. Wants to see if can get a Muscle relaxer as well.), Foot Problem (Left foot drops. Blaise Vera recently because of this as well. Blaise Martiny in July and hurt the right elbow witch has fluid in it.), and Other (Has been over 10 years since the last Pneumonia shot.)         ASSESSMENT/PLAN:  1. Generalized anxiety disorder  -     sertraline (ZOLOFT) 25 MG tablet; Take 1 tablet by mouth daily, Disp-30 tablet, R-1Normal  2. Essential (primary) hypertension  -     amLODIPine (NORVASC) 10 MG tablet; Take 1 tablet by mouth daily, Disp-90 tablet, R-1Normal  -     Lipid Panel; Future  3. Pure hypercholesterolemia, unspecified  -     atorvastatin (LIPITOR) 40 MG tablet; Take 1 tablet by mouth daily, Disp-90 tablet, R-1Normal  -     Comprehensive Metabolic Panel; Future  -     Lipid Panel; Future  4. Prediabetes  -     metFORMIN (GLUCOPHAGE) 500 MG tablet; Take 1 tablet by mouth 2 times daily (with meals), Disp-180 tablet, R-1Normal  -     Hemoglobin A1C; Future  5. Elbow swelling, right  -     ibuprofen (ADVIL;MOTRIN) 800 MG tablet; Take 1 tablet by mouth every 8 hours as needed for Pain, Disp-120 tablet, R-2Normal  -     XR ELBOW RIGHT (MIN 3 VIEWS); Future  6. Other tobacco product nicotine dependence, uncomplicated  -     varenicline (CHANTIX) 0.5 MG tablet; Take 1-2 tablets by mouth See Admin Instructions 0.5mg DAILY for 3 days followed by 0.5mg TWICE DAILY for 4 days followed by 1mg TWICE DAILY, Disp-57 tablet, R-0Normal  7. Chronic radicular low back pain  -     cyclobenzaprine (FLEXERIL) 10 MG tablet; Take 1 tablet by mouth nightly as needed for Muscle spasms, Disp-10 tablet, R-0Normal  Try chantix but it may not be covered by insurance. If not, can do 1800 quit now. I'd like to avoid wellbutrin until anxiety is controlled.    Start

## 2023-09-19 LAB
EST. AVERAGE GLUCOSE BLD GHB EST-MCNC: 128 MG/DL
HBA1C MFR BLD: 6.1 % (ref 4.8–5.6)

## 2023-09-20 ENCOUNTER — TELEPHONE (OUTPATIENT)
Dept: FAMILY MEDICINE CLINIC | Facility: CLINIC | Age: 53
End: 2023-09-20

## 2023-09-20 NOTE — TELEPHONE ENCOUNTER
----- Message from Marge Pereira MD sent at 9/19/2023 12:58 PM EDT -----  Elbow shows no fracture. He should wrap the arm and take antiinflammatories as needed for discomfort. Avoid putting pressure on the elbow directly. If not improving, I can send to ortho.

## 2023-09-20 NOTE — TELEPHONE ENCOUNTER
I called the patient, but got his voice mail box. I left a message letting him know about the results. I asked him to please call back if there are any questions.

## 2023-09-22 ENCOUNTER — TELEPHONE (OUTPATIENT)
Dept: FAMILY MEDICINE CLINIC | Facility: CLINIC | Age: 53
End: 2023-09-22

## 2023-09-22 DIAGNOSIS — M70.21 OLECRANON BURSITIS OF RIGHT ELBOW: Primary | ICD-10-CM

## 2023-09-22 NOTE — TELEPHONE ENCOUNTER
----- Message from Chaparrita Luna sent at 9/22/2023 10:44 AM EDT -----  Subject: Referral Request    Reason for referral request? Patient called and wanted to go forward with   the referral regarding his right elbow to orthopedics. Please return call   once placed. Provider patient wants to be referred to(if known):     Provider Phone Number(if known):     Additional Information for Provider?   ---------------------------------------------------------------------------  --------------  Brittney Holderness Kirt    [phone]; OK to leave message on voicemail  ---------------------------------------------------------------------------  --------------

## 2023-10-03 ENCOUNTER — OFFICE VISIT (OUTPATIENT)
Dept: ORTHOPEDIC SURGERY | Age: 53
End: 2023-10-03

## 2023-10-03 DIAGNOSIS — M25.521 RIGHT ELBOW PAIN: Primary | ICD-10-CM

## 2023-10-03 RX ORDER — MELOXICAM 15 MG/1
15 TABLET ORAL DAILY
Qty: 45 TABLET | Refills: 0 | Status: SHIPPED | OUTPATIENT
Start: 2023-10-03 | End: 2023-11-17

## 2023-10-03 NOTE — PROGRESS NOTES
[facility] Note    Name: Janna Pryor  YOB: 1970  Gender: male  MRN: [id number]      CC: Patient referred for evaluation of upper extremity pain    HPI: Jackson Reyes is a 47 y.o. male Right hand dominant with a chief complaint of right elbow swelling, symptoms have been going on for 2 and half months when he fell face first and braced his fall with both hands covering his face, he has tried a over-the-counter elbow brace, ice and has not had much relief. ROS/Meds/PSH/PMH/FH/SH: I personally reviewed the patients standard intake form. Pertinents are discussed in the HPI    Physical Examination:  General: Awake and alert. HEENT: Normocephalic, atraumatic  CV/Pulm: Breathing even and unlabored  Skin: No obvious rashes noted. Lymphatic: No obvious evidence of lymphedema or lymphadenopathy    Musculoskeletal Exam:  Examination on the right upper extremity demonstrates cap refill < 5 seconds in all fingers, mild swelling of the right olecranon bursa, there are some calcifications palpable under the skin, no significant erythema or warmth is noted. Imaging / Electrodiagnostic Tests:     X-ray of the right elbow from an outside source was reviewed and independently interpreted, this demonstrates soft tissue swelling overlying the olecranon without bony abnormality    Assessment:   1. Right elbow pain        Plan:   We discussed the diagnosis and different treatment options. We discussed observation, therapy, antiinflammatory medications and other pertinent treatment modalities. After discussing in detail the patient elects to proceed with compressive elbow sleeve, Mobic 15 daily for 6 weeks, I will reassess the patient in 2 months, we discussed olecranon bursitis treatment options in detail including aspiration, bursectomy and conservative measures. Patient voiced accordance and understanding of the information provided and the formulated plan.  All Nurse

## 2023-10-03 NOTE — PROGRESS NOTES
Patient was fitted and instructed on a Reparel Elbow Sleeve for the right elbow. Patient read and signed documenting they understand and agree to [facility] current DME return policy.

## 2023-10-30 ENCOUNTER — TELEMEDICINE (OUTPATIENT)
Dept: FAMILY MEDICINE CLINIC | Facility: CLINIC | Age: 53
End: 2023-10-30
Payer: COMMERCIAL

## 2023-10-30 DIAGNOSIS — N52.9 ERECTILE DYSFUNCTION, UNSPECIFIED ERECTILE DYSFUNCTION TYPE: Primary | ICD-10-CM

## 2023-10-30 DIAGNOSIS — F41.1 GENERALIZED ANXIETY DISORDER: ICD-10-CM

## 2023-10-30 PROCEDURE — 99214 OFFICE O/P EST MOD 30 MIN: CPT | Performed by: FAMILY MEDICINE

## 2023-10-30 PROCEDURE — 4004F PT TOBACCO SCREEN RCVD TLK: CPT | Performed by: FAMILY MEDICINE

## 2023-10-30 PROCEDURE — G8419 CALC BMI OUT NRM PARAM NOF/U: HCPCS | Performed by: FAMILY MEDICINE

## 2023-10-30 PROCEDURE — 3017F COLORECTAL CA SCREEN DOC REV: CPT | Performed by: FAMILY MEDICINE

## 2023-10-30 PROCEDURE — G8427 DOCREV CUR MEDS BY ELIG CLIN: HCPCS | Performed by: FAMILY MEDICINE

## 2023-10-30 PROCEDURE — G8484 FLU IMMUNIZE NO ADMIN: HCPCS | Performed by: FAMILY MEDICINE

## 2023-10-30 RX ORDER — TADALAFIL 10 MG/1
10 TABLET ORAL PRN
Qty: 90 TABLET | Refills: 1 | Status: SHIPPED | OUTPATIENT
Start: 2023-10-30

## 2023-10-30 RX ORDER — SERTRALINE HYDROCHLORIDE 25 MG/1
25 TABLET, FILM COATED ORAL DAILY
Qty: 90 TABLET | Refills: 0 | Status: SHIPPED | OUTPATIENT
Start: 2023-10-30

## 2023-10-30 ASSESSMENT — ENCOUNTER SYMPTOMS
CONSTIPATION: 0
NAUSEA: 0
DIARRHEA: 0

## 2023-10-30 NOTE — PROGRESS NOTES
Precious HENDRIX/ [facility]  Provider was located at Home ([address]): Federico Pereira MD

## 2024-03-25 ENCOUNTER — OFFICE VISIT (OUTPATIENT)
Dept: FAMILY MEDICINE CLINIC | Facility: CLINIC | Age: 54
End: 2024-03-25
Payer: COMMERCIAL

## 2024-03-25 VITALS
WEIGHT: 187.9 LBS | DIASTOLIC BLOOD PRESSURE: 81 MMHG | BODY MASS INDEX: 28.48 KG/M2 | HEIGHT: 68 IN | SYSTOLIC BLOOD PRESSURE: 125 MMHG | TEMPERATURE: 97.1 F | HEART RATE: 63 BPM

## 2024-03-25 DIAGNOSIS — M54.42 CHRONIC BILATERAL LOW BACK PAIN WITH BILATERAL SCIATICA: Primary | ICD-10-CM

## 2024-03-25 DIAGNOSIS — M21.372 FOOT DROP, LEFT: ICD-10-CM

## 2024-03-25 DIAGNOSIS — G89.29 CHRONIC BILATERAL LOW BACK PAIN WITH BILATERAL SCIATICA: Primary | ICD-10-CM

## 2024-03-25 DIAGNOSIS — M54.41 CHRONIC BILATERAL LOW BACK PAIN WITH BILATERAL SCIATICA: Primary | ICD-10-CM

## 2024-03-25 PROCEDURE — 4004F PT TOBACCO SCREEN RCVD TLK: CPT | Performed by: FAMILY MEDICINE

## 2024-03-25 PROCEDURE — 3017F COLORECTAL CA SCREEN DOC REV: CPT | Performed by: FAMILY MEDICINE

## 2024-03-25 PROCEDURE — 3074F SYST BP LT 130 MM HG: CPT | Performed by: FAMILY MEDICINE

## 2024-03-25 PROCEDURE — G8419 CALC BMI OUT NRM PARAM NOF/U: HCPCS | Performed by: FAMILY MEDICINE

## 2024-03-25 PROCEDURE — 99214 OFFICE O/P EST MOD 30 MIN: CPT | Performed by: FAMILY MEDICINE

## 2024-03-25 PROCEDURE — G8427 DOCREV CUR MEDS BY ELIG CLIN: HCPCS | Performed by: FAMILY MEDICINE

## 2024-03-25 PROCEDURE — 3079F DIAST BP 80-89 MM HG: CPT | Performed by: FAMILY MEDICINE

## 2024-03-25 PROCEDURE — G8484 FLU IMMUNIZE NO ADMIN: HCPCS | Performed by: FAMILY MEDICINE

## 2024-03-25 RX ORDER — PANTOPRAZOLE SODIUM 40 MG/1
40 TABLET, DELAYED RELEASE ORAL DAILY
Qty: 30 TABLET | Refills: 0 | Status: CANCELLED | OUTPATIENT
Start: 2024-03-25

## 2024-03-25 RX ORDER — GABAPENTIN 100 MG/1
100-200 CAPSULE ORAL 3 TIMES DAILY PRN
Qty: 180 CAPSULE | Refills: 2 | Status: SHIPPED | OUTPATIENT
Start: 2024-03-25 | End: 2025-03-20

## 2024-03-25 RX ORDER — PREDNISONE 10 MG/1
TABLET ORAL
Qty: 18 TABLET | Refills: 0 | Status: SHIPPED | OUTPATIENT
Start: 2024-03-25 | End: 2024-04-04

## 2024-03-25 RX ORDER — METAXALONE 800 MG/1
800 TABLET ORAL 3 TIMES DAILY PRN
Qty: 90 TABLET | Refills: 2 | Status: SHIPPED | OUTPATIENT
Start: 2024-03-25

## 2024-03-25 RX ORDER — MELOXICAM 15 MG/1
15 TABLET ORAL DAILY PRN
Qty: 30 TABLET | Refills: 2 | Status: SHIPPED | OUTPATIENT
Start: 2024-03-25

## 2024-03-25 NOTE — PATIENT INSTRUCTIONS

## 2024-03-25 NOTE — PROGRESS NOTES
[place] [address]  Phone: [phone]  Fax: [phone]  Email: [email or web address]      Encounter Info  Jackson Nath; Established patient 53 y.o.male; seen 3/25/2024 for: Back Pain (Tingling traveling down to the back of both legs feels like needles )      Assessment & Plan    1. Chronic bilateral low back pain with bilateral sciatica  -     gabapentin (NEURONTIN) 100 MG capsule; Take 1-2 capsules by mouth 3 times daily as needed (chronic pain) for up to 360 days., Disp-180 capsule, R-2Normal  -     meloxicam (MOBIC) 15 MG tablet; Take 1 tablet by mouth daily as needed for Pain, Disp-30 tablet, R-2Normal  -     metaxalone (SKELAXIN) 800 MG tablet; Take 1 tablet by mouth 3 times daily as needed for Pain, Disp-90 tablet, R-2Normal  -     predniSONE (DELTASONE) 10 MG tablet; 3 pills/day X 3 days, then 2 pills/day X 3 days, then 1 pill/day X 2 days, then 1/2 pill/day X 2 days, Disp-18 tablet, R-0Normal  2. Foot drop, left  -     gabapentin (NEURONTIN) 100 MG capsule; Take 1-2 capsules by mouth 3 times daily as needed (chronic pain) for up to 360 days., Disp-180 capsule, R-2Normal  -     meloxicam (MOBIC) 15 MG tablet; Take 1 tablet by mouth daily as needed for Pain, Disp-30 tablet, R-2Normal  -     metaxalone (SKELAXIN) 800 MG tablet; Take 1 tablet by mouth 3 times daily as needed for Pain, Disp-90 tablet, R-2Normal  -     predniSONE (DELTASONE) 10 MG tablet; 3 pills/day X 3 days, then 2 pills/day X 3 days, then 1 pill/day X 2 days, then 1/2 pill/day X 2 days, Disp-18 tablet, R-0Normal    Problem and/or Symptoms are currently not stable and/or well controlled on current treatment plan. Will have patient follow up as directed and make the following changes for further evaluation and/or treatment:     Restarting Gabapentin & Mobic regimen plus providing pt with non-sedating muscle relaxer Skelaxin TID PRN. Also providing him with 10 day low dose

## 2024-05-21 ENCOUNTER — OFFICE VISIT (OUTPATIENT)
Dept: FAMILY MEDICINE CLINIC | Facility: CLINIC | Age: 54
End: 2024-05-21
Payer: COMMERCIAL

## 2024-05-21 VITALS
RESPIRATION RATE: 19 BRPM | TEMPERATURE: 96.6 F | BODY MASS INDEX: 28.34 KG/M2 | SYSTOLIC BLOOD PRESSURE: 127 MMHG | DIASTOLIC BLOOD PRESSURE: 83 MMHG | HEART RATE: 65 BPM | OXYGEN SATURATION: 97 % | WEIGHT: 187 LBS | HEIGHT: 68 IN

## 2024-05-21 DIAGNOSIS — F41.9 ANXIETY: ICD-10-CM

## 2024-05-21 DIAGNOSIS — N52.9 ERECTILE DYSFUNCTION, UNSPECIFIED ERECTILE DYSFUNCTION TYPE: ICD-10-CM

## 2024-05-21 DIAGNOSIS — R73.03 PREDIABETES: ICD-10-CM

## 2024-05-21 DIAGNOSIS — I10 PRIMARY HYPERTENSION: Primary | ICD-10-CM

## 2024-05-21 DIAGNOSIS — M54.41 CHRONIC BILATERAL LOW BACK PAIN WITH BILATERAL SCIATICA: ICD-10-CM

## 2024-05-21 DIAGNOSIS — I10 PRIMARY HYPERTENSION: ICD-10-CM

## 2024-05-21 DIAGNOSIS — F41.1 GENERALIZED ANXIETY DISORDER: ICD-10-CM

## 2024-05-21 DIAGNOSIS — M21.372 FOOT DROP, LEFT: ICD-10-CM

## 2024-05-21 DIAGNOSIS — K21.9 GASTRO-ESOPHAGEAL REFLUX DISEASE WITHOUT ESOPHAGITIS: ICD-10-CM

## 2024-05-21 DIAGNOSIS — M54.42 CHRONIC BILATERAL LOW BACK PAIN WITH BILATERAL SCIATICA: ICD-10-CM

## 2024-05-21 DIAGNOSIS — G89.29 CHRONIC BILATERAL LOW BACK PAIN WITH BILATERAL SCIATICA: ICD-10-CM

## 2024-05-21 LAB
ALBUMIN SERPL-MCNC: 4.2 G/DL (ref 3.5–5)
ALBUMIN/GLOB SERPL: 1.3 (ref 1–1.9)
ALP SERPL-CCNC: 85 U/L (ref 40–129)
ALT SERPL-CCNC: 56 U/L (ref 12–65)
ANION GAP SERPL CALC-SCNC: 12 MMOL/L (ref 9–18)
AST SERPL-CCNC: 42 U/L (ref 15–37)
BILIRUB SERPL-MCNC: <0.2 MG/DL (ref 0–1.2)
BUN SERPL-MCNC: 13 MG/DL (ref 6–23)
CALCIUM SERPL-MCNC: 10.1 MG/DL (ref 8.8–10.2)
CHLORIDE SERPL-SCNC: 102 MMOL/L (ref 98–107)
CHOLEST SERPL-MCNC: 134 MG/DL (ref 0–200)
CO2 SERPL-SCNC: 26 MMOL/L (ref 20–28)
CREAT SERPL-MCNC: 1 MG/DL (ref 0.8–1.3)
EST. AVERAGE GLUCOSE BLD GHB EST-MCNC: 140 MG/DL
GLOBULIN SER CALC-MCNC: 3.2 G/DL (ref 2.3–3.5)
GLUCOSE SERPL-MCNC: 80 MG/DL (ref 70–99)
HBA1C MFR BLD: 6.5 % (ref 0–5.6)
HDLC SERPL-MCNC: 47 MG/DL (ref 40–60)
HDLC SERPL: 2.9 (ref 0–5)
LDLC SERPL CALC-MCNC: 70 MG/DL (ref 0–100)
POTASSIUM SERPL-SCNC: 4.6 MMOL/L (ref 3.5–5.1)
PROT SERPL-MCNC: 7.4 G/DL (ref 6.3–8.2)
SODIUM SERPL-SCNC: 140 MMOL/L (ref 136–145)
TRIGL SERPL-MCNC: 88 MG/DL (ref 0–150)
VLDLC SERPL CALC-MCNC: 18 MG/DL (ref 6–23)

## 2024-05-21 PROCEDURE — 99214 OFFICE O/P EST MOD 30 MIN: CPT | Performed by: FAMILY MEDICINE

## 2024-05-21 PROCEDURE — 4004F PT TOBACCO SCREEN RCVD TLK: CPT | Performed by: FAMILY MEDICINE

## 2024-05-21 PROCEDURE — 3017F COLORECTAL CA SCREEN DOC REV: CPT | Performed by: FAMILY MEDICINE

## 2024-05-21 PROCEDURE — G8427 DOCREV CUR MEDS BY ELIG CLIN: HCPCS | Performed by: FAMILY MEDICINE

## 2024-05-21 PROCEDURE — G8419 CALC BMI OUT NRM PARAM NOF/U: HCPCS | Performed by: FAMILY MEDICINE

## 2024-05-21 PROCEDURE — 3074F SYST BP LT 130 MM HG: CPT | Performed by: FAMILY MEDICINE

## 2024-05-21 PROCEDURE — 3079F DIAST BP 80-89 MM HG: CPT | Performed by: FAMILY MEDICINE

## 2024-05-21 RX ORDER — TADALAFIL 10 MG/1
10 TABLET ORAL PRN
Qty: 90 TABLET | Refills: 1 | Status: SHIPPED | OUTPATIENT
Start: 2024-05-21

## 2024-05-21 RX ORDER — PANTOPRAZOLE SODIUM 40 MG/1
40 TABLET, DELAYED RELEASE ORAL DAILY
Qty: 90 TABLET | Refills: 1 | Status: SHIPPED | OUTPATIENT
Start: 2024-05-21

## 2024-05-21 RX ORDER — METAXALONE 800 MG/1
800 TABLET ORAL 3 TIMES DAILY PRN
Qty: 90 TABLET | Refills: 2 | Status: SHIPPED | OUTPATIENT
Start: 2024-05-21

## 2024-05-21 SDOH — ECONOMIC STABILITY: FOOD INSECURITY: WITHIN THE PAST 12 MONTHS, THE FOOD YOU BOUGHT JUST DIDN'T LAST AND YOU DIDN'T HAVE MONEY TO GET MORE.: NEVER TRUE

## 2024-05-21 SDOH — ECONOMIC STABILITY: FOOD INSECURITY: WITHIN THE PAST 12 MONTHS, YOU WORRIED THAT YOUR FOOD WOULD RUN OUT BEFORE YOU GOT MONEY TO BUY MORE.: NEVER TRUE

## 2024-05-21 SDOH — ECONOMIC STABILITY: INCOME INSECURITY: HOW HARD IS IT FOR YOU TO PAY FOR THE VERY BASICS LIKE FOOD, HOUSING, MEDICAL CARE, AND HEATING?: NOT HARD AT ALL

## 2024-05-21 ASSESSMENT — PATIENT HEALTH QUESTIONNAIRE - PHQ9
1. LITTLE INTEREST OR PLEASURE IN DOING THINGS: NOT AT ALL
SUM OF ALL RESPONSES TO PHQ9 QUESTIONS 1 & 2: 0
SUM OF ALL RESPONSES TO PHQ QUESTIONS 1-9: 0
2. FEELING DOWN, DEPRESSED OR HOPELESS: NOT AT ALL
SUM OF ALL RESPONSES TO PHQ QUESTIONS 1-9: 0

## 2024-05-21 ASSESSMENT — ENCOUNTER SYMPTOMS
SHORTNESS OF BREATH: 0
ABDOMINAL PAIN: 0
NAUSEA: 0
DIARRHEA: 0
CONSTIPATION: 0

## 2024-05-21 NOTE — PROGRESS NOTES
Final     F/U anxiety - well controlled on current regimen w/o adverse effects. Denies oversedation. On zoloft 25mg.   F/u ED - would like cialis 10mg refilled.   F/U GERD - Well controlled on current regimen w/o adverse effects. No red flag sx's.likes to have protonix on hand prn. Needs refill.     Was seen by pain management started on meloxicam which seems to help him with his leg pain.     Review of Systems   Constitutional:  Negative for fatigue and unexpected weight change.   HENT:  Negative for congestion.    Eyes:  Negative for visual disturbance.   Respiratory:  Negative for shortness of breath.    Cardiovascular:  Negative for chest pain, palpitations and leg swelling.   Gastrointestinal:  Negative for abdominal pain, constipation, diarrhea and nausea.   Genitourinary:  Negative for dysuria.   Skin:  Negative for rash.   Neurological:  Negative for light-headedness.   Psychiatric/Behavioral:  Negative for dysphoric mood.           Objective   Physical Exam  Vitals reviewed.   Constitutional:       Appearance: Normal appearance.   HENT:      Head: Normocephalic and atraumatic.      Nose: Nose normal.      Mouth/Throat:      Mouth: Mucous membranes are moist.      Pharynx: Oropharynx is clear.   Eyes:      Extraocular Movements: Extraocular movements intact.      Conjunctiva/sclera: Conjunctivae normal.      Pupils: Pupils are equal, round, and reactive to light.   Cardiovascular:      Rate and Rhythm: Normal rate and regular rhythm.      Pulses: Normal pulses.      Heart sounds: Normal heart sounds.   Pulmonary:      Effort: Pulmonary effort is normal.      Breath sounds: Normal breath sounds.   Abdominal:      General: Abdomen is flat. Bowel sounds are normal.      Palpations: Abdomen is soft.   Musculoskeletal:         General: Normal range of motion.   Skin:     General: Skin is warm and dry.   Neurological:      General: No focal deficit present.      Mental Status: He is alert.     /83 (Site:

## 2024-08-07 ENCOUNTER — TELEPHONE (OUTPATIENT)
Dept: FAMILY MEDICINE CLINIC | Facility: CLINIC | Age: 54
End: 2024-08-07

## 2024-08-07 NOTE — TELEPHONE ENCOUNTER
Called saying he needed refills of amlodopine sent in, advised pt there should be refills at the pharmacy and he just needed to call them and ask for the refill.

## 2024-08-09 DIAGNOSIS — I10 ESSENTIAL (PRIMARY) HYPERTENSION: ICD-10-CM

## 2024-08-09 RX ORDER — AMLODIPINE BESYLATE 10 MG/1
10 TABLET ORAL DAILY
Qty: 90 TABLET | Refills: 1 | Status: SHIPPED | OUTPATIENT
Start: 2024-08-09

## 2024-11-12 ENCOUNTER — OFFICE VISIT (OUTPATIENT)
Dept: FAMILY MEDICINE CLINIC | Facility: CLINIC | Age: 54
End: 2024-11-12
Payer: COMMERCIAL

## 2024-11-12 VITALS
OXYGEN SATURATION: 95 % | WEIGHT: 193 LBS | RESPIRATION RATE: 19 BRPM | HEART RATE: 77 BPM | DIASTOLIC BLOOD PRESSURE: 83 MMHG | BODY MASS INDEX: 29.25 KG/M2 | TEMPERATURE: 96.6 F | HEIGHT: 68 IN | SYSTOLIC BLOOD PRESSURE: 127 MMHG

## 2024-11-12 DIAGNOSIS — N52.9 ERECTILE DYSFUNCTION, UNSPECIFIED ERECTILE DYSFUNCTION TYPE: ICD-10-CM

## 2024-11-12 DIAGNOSIS — R73.03 PREDIABETES: Primary | ICD-10-CM

## 2024-11-12 DIAGNOSIS — K21.9 GASTRO-ESOPHAGEAL REFLUX DISEASE WITHOUT ESOPHAGITIS: ICD-10-CM

## 2024-11-12 DIAGNOSIS — Z12.5 SCREENING FOR PROSTATE CANCER: ICD-10-CM

## 2024-11-12 DIAGNOSIS — E78.00 PURE HYPERCHOLESTEROLEMIA, UNSPECIFIED: ICD-10-CM

## 2024-11-12 PROCEDURE — 4004F PT TOBACCO SCREEN RCVD TLK: CPT | Performed by: FAMILY MEDICINE

## 2024-11-12 PROCEDURE — 3079F DIAST BP 80-89 MM HG: CPT | Performed by: FAMILY MEDICINE

## 2024-11-12 PROCEDURE — G8419 CALC BMI OUT NRM PARAM NOF/U: HCPCS | Performed by: FAMILY MEDICINE

## 2024-11-12 PROCEDURE — 99214 OFFICE O/P EST MOD 30 MIN: CPT | Performed by: FAMILY MEDICINE

## 2024-11-12 PROCEDURE — G8484 FLU IMMUNIZE NO ADMIN: HCPCS | Performed by: FAMILY MEDICINE

## 2024-11-12 PROCEDURE — 3074F SYST BP LT 130 MM HG: CPT | Performed by: FAMILY MEDICINE

## 2024-11-12 PROCEDURE — 3017F COLORECTAL CA SCREEN DOC REV: CPT | Performed by: FAMILY MEDICINE

## 2024-11-12 PROCEDURE — G8427 DOCREV CUR MEDS BY ELIG CLIN: HCPCS | Performed by: FAMILY MEDICINE

## 2024-11-12 RX ORDER — TADALAFIL 10 MG/1
10 TABLET ORAL PRN
Qty: 90 TABLET | Refills: 1 | Status: SHIPPED | OUTPATIENT
Start: 2024-11-12

## 2024-11-12 RX ORDER — PANTOPRAZOLE SODIUM 40 MG/1
40 TABLET, DELAYED RELEASE ORAL DAILY
Qty: 90 TABLET | Refills: 1 | Status: SHIPPED | OUTPATIENT
Start: 2024-11-12

## 2024-11-12 RX ORDER — ATORVASTATIN CALCIUM 40 MG/1
40 TABLET, FILM COATED ORAL DAILY
Qty: 90 TABLET | Refills: 1 | Status: SHIPPED | OUTPATIENT
Start: 2024-11-12

## 2024-11-12 ASSESSMENT — ENCOUNTER SYMPTOMS
ABDOMINAL PAIN: 0
CONSTIPATION: 0
NAUSEA: 0
DIARRHEA: 0
SHORTNESS OF BREATH: 0

## 2024-11-12 NOTE — PROGRESS NOTES
Jackson Nath (:  1970) is a 54 y.o. male,Established patient, here for evaluation of the following chief complaint(s):  6 Month Follow-Up (On HTN, Prediabetes and Anxiety. Having some issues with his neuropathy.)      Assessment & Plan   1. Prediabetes  -     Hemoglobin A1C; Future  2. Pure hypercholesterolemia, unspecified  -     atorvastatin (LIPITOR) 40 MG tablet; Take 1 tablet by mouth daily, Disp-90 tablet, R-1Normal  -     Comprehensive Metabolic Panel; Future  -     Lipid Panel; Future  3. Erectile dysfunction, unspecified erectile dysfunction type  -     tadalafil (CIALIS) 10 MG tablet; Take 1 tablet by mouth as needed for Erectile Dysfunction, Disp-90 tablet, R-1Normal  4. Screening for prostate cancer  -     PSA Screening; Future  5. Gastro-esophageal reflux disease without esophagitis  -     pantoprazole (PROTONIX) 40 MG tablet; Take 1 tablet by mouth daily, Disp-90 tablet, R-1Normal  Encouraged gabapentin to help with neuropathy.  Also gave info on pain scrambler to see if this may be of help to him.   C/w other meds as rx'd.   I will review labs ordered today and adjust medication regimen as needed.       Return in about 6 months (around 2025).       Subjective   HPI  Chief Complaint   Patient presents with    6 Month Follow-Up     On HTN, Prediabetes and Anxiety. Having some issues with his neuropathy.     F/U HTN - BP well controlled. Compliant w/ med(s).  Denies s/s target organ damage.   F/U  Dyslipidemia -  Pt states compliant w/ regimen w/o medication adverse effects.   F/U prediabetes - pt denies s/s Diabetes. Metformin 500mg once in the morning.   Hemoglobin A1C   Date Value Ref Range Status   2024 6.5 (H) 0 - 5.6 % Final     Comment:     Reference Range  Normal       <5.7%  Prediabetes  5.7-6.4%  Diabetes     >6.4%       F/U anxiety - well controlled on current regimen w/o adverse effects. Denies oversedation. On zoloft 50mg  F/u ED - would like cialis 10mg

## 2024-11-15 ENCOUNTER — LAB (OUTPATIENT)
Dept: FAMILY MEDICINE CLINIC | Facility: CLINIC | Age: 54
End: 2024-11-15

## 2024-11-15 DIAGNOSIS — E78.00 PURE HYPERCHOLESTEROLEMIA, UNSPECIFIED: ICD-10-CM

## 2024-11-15 DIAGNOSIS — R73.03 PREDIABETES: ICD-10-CM

## 2024-11-15 DIAGNOSIS — Z12.5 SCREENING FOR PROSTATE CANCER: ICD-10-CM

## 2024-11-15 LAB
ALBUMIN SERPL-MCNC: 3.8 G/DL (ref 3.5–5)
ALBUMIN/GLOB SERPL: 1.1 (ref 1–1.9)
ALP SERPL-CCNC: 79 U/L (ref 40–129)
ALT SERPL-CCNC: 58 U/L (ref 8–55)
ANION GAP SERPL CALC-SCNC: 9 MMOL/L (ref 7–16)
AST SERPL-CCNC: 50 U/L (ref 15–37)
BILIRUB SERPL-MCNC: 0.3 MG/DL (ref 0–1.2)
BUN SERPL-MCNC: 11 MG/DL (ref 6–23)
CALCIUM SERPL-MCNC: 9.2 MG/DL (ref 8.8–10.2)
CHLORIDE SERPL-SCNC: 104 MMOL/L (ref 98–107)
CHOLEST SERPL-MCNC: 187 MG/DL (ref 0–200)
CO2 SERPL-SCNC: 25 MMOL/L (ref 20–29)
CREAT SERPL-MCNC: 1.04 MG/DL (ref 0.8–1.3)
EST. AVERAGE GLUCOSE BLD GHB EST-MCNC: 136 MG/DL
GLOBULIN SER CALC-MCNC: 3.5 G/DL (ref 2.3–3.5)
GLUCOSE SERPL-MCNC: 107 MG/DL (ref 70–99)
HBA1C MFR BLD: 6.4 % (ref 0–5.6)
HDLC SERPL-MCNC: 53 MG/DL (ref 40–60)
HDLC SERPL: 3.5 (ref 0–5)
LDLC SERPL CALC-MCNC: 123 MG/DL (ref 0–100)
POTASSIUM SERPL-SCNC: 4.2 MMOL/L (ref 3.5–5.1)
PROT SERPL-MCNC: 7.3 G/DL (ref 6.3–8.2)
PSA SERPL-MCNC: 1.6 NG/ML (ref 0–4)
SODIUM SERPL-SCNC: 138 MMOL/L (ref 136–145)
TRIGL SERPL-MCNC: 55 MG/DL (ref 0–150)
VLDLC SERPL CALC-MCNC: 11 MG/DL (ref 6–23)

## 2025-02-17 DIAGNOSIS — I10 ESSENTIAL (PRIMARY) HYPERTENSION: ICD-10-CM

## 2025-02-17 RX ORDER — AMLODIPINE BESYLATE 10 MG/1
10 TABLET ORAL DAILY
Qty: 30 TABLET | Refills: 0 | Status: SHIPPED | OUTPATIENT
Start: 2025-02-17

## 2025-02-17 NOTE — TELEPHONE ENCOUNTER
Pharmacy told pt he needed to be in touch with his provider about his blood pressure medicine, pharmacy didn't specify why he needed to be in touch with us. Please call pt back for clarification

## 2025-02-17 NOTE — TELEPHONE ENCOUNTER
I called the patient and spoke to him about this. He had already know at his last visit that Dr. Kathleen was leaving, but never set up an appointment to establish care with another provider. He stated that he wanted to see about seeing Dr. Kathleen at her new place and I asked him to reach out to her soon to see about getting this done. He wanted a refill only appointment with someone here for the time being to get all medication that are needed. I set it up with Dr. Enamorado on 2-24-25, but he will need his Amlodipine before this.

## 2025-03-14 DIAGNOSIS — I10 ESSENTIAL (PRIMARY) HYPERTENSION: ICD-10-CM

## 2025-03-17 RX ORDER — AMLODIPINE BESYLATE 10 MG/1
10 TABLET ORAL DAILY
Qty: 90 TABLET | Refills: 1 | OUTPATIENT
Start: 2025-03-17

## 2025-03-18 DIAGNOSIS — I10 ESSENTIAL (PRIMARY) HYPERTENSION: ICD-10-CM

## 2025-03-18 RX ORDER — AMLODIPINE BESYLATE 10 MG/1
10 TABLET ORAL DAILY
Qty: 30 TABLET | Refills: 0 | OUTPATIENT
Start: 2025-03-18

## 2025-03-26 DIAGNOSIS — I10 ESSENTIAL (PRIMARY) HYPERTENSION: ICD-10-CM

## 2025-03-26 RX ORDER — AMLODIPINE BESYLATE 10 MG/1
10 TABLET ORAL DAILY
Qty: 30 TABLET | Refills: 0 | Status: SHIPPED | OUTPATIENT
Start: 2025-03-26

## 2025-03-26 NOTE — TELEPHONE ENCOUNTER
Last visit was on 11-12-24 with Dr. Kathleen, and next visit is scheduled for 4-23-25 with ROSY Issa.

## 2025-03-27 ENCOUNTER — TELEPHONE (OUTPATIENT)
Dept: FAMILY MEDICINE CLINIC | Facility: CLINIC | Age: 55
End: 2025-03-27

## 2025-03-27 NOTE — TELEPHONE ENCOUNTER
----- Message from Stephan GREEN sent at 3/26/2025  8:33 AM EDT -----  Regarding: [facility] Appointment Request  [facility] Appointment Request    Patient needs appointment for [facility] Appointment Type: New Patient.    Patient Requested Dates(s): Monday , March 31st   Patient Requested Time:  morning   Provider Name: Nikia Lunsford Man, KAITE-CNP      Reason for Appointment Request: New Patient - No appointments available during search  --------------------------------------------------------------------------------------------------------------------------    Relationship to Patient: Self     Call Back Information: OK to leave message on voicemail  Preferred Call Back Number: [phone]

## 2025-04-23 ENCOUNTER — OFFICE VISIT (OUTPATIENT)
Dept: FAMILY MEDICINE CLINIC | Facility: CLINIC | Age: 55
End: 2025-04-23
Payer: COMMERCIAL

## 2025-04-23 VITALS
TEMPERATURE: 96.8 F | SYSTOLIC BLOOD PRESSURE: 137 MMHG | HEART RATE: 70 BPM | BODY MASS INDEX: 29.1 KG/M2 | OXYGEN SATURATION: 97 % | HEIGHT: 68 IN | DIASTOLIC BLOOD PRESSURE: 89 MMHG | WEIGHT: 192 LBS | RESPIRATION RATE: 19 BRPM

## 2025-04-23 DIAGNOSIS — M54.42 CHRONIC BILATERAL LOW BACK PAIN WITH BILATERAL SCIATICA: ICD-10-CM

## 2025-04-23 DIAGNOSIS — N52.9 ERECTILE DYSFUNCTION, UNSPECIFIED ERECTILE DYSFUNCTION TYPE: ICD-10-CM

## 2025-04-23 DIAGNOSIS — R73.03 PREDIABETES: ICD-10-CM

## 2025-04-23 DIAGNOSIS — K21.9 GASTRO-ESOPHAGEAL REFLUX DISEASE WITHOUT ESOPHAGITIS: ICD-10-CM

## 2025-04-23 DIAGNOSIS — G89.29 CHRONIC BILATERAL LOW BACK PAIN WITH BILATERAL SCIATICA: ICD-10-CM

## 2025-04-23 DIAGNOSIS — E78.00 PURE HYPERCHOLESTEROLEMIA, UNSPECIFIED: ICD-10-CM

## 2025-04-23 DIAGNOSIS — I10 ESSENTIAL (PRIMARY) HYPERTENSION: ICD-10-CM

## 2025-04-23 DIAGNOSIS — I10 ESSENTIAL (PRIMARY) HYPERTENSION: Primary | ICD-10-CM

## 2025-04-23 DIAGNOSIS — F41.1 GENERALIZED ANXIETY DISORDER: ICD-10-CM

## 2025-04-23 DIAGNOSIS — M21.372 FOOT DROP, LEFT: ICD-10-CM

## 2025-04-23 DIAGNOSIS — M54.41 CHRONIC BILATERAL LOW BACK PAIN WITH BILATERAL SCIATICA: ICD-10-CM

## 2025-04-23 LAB
ALBUMIN SERPL-MCNC: 4 G/DL (ref 3.5–5)
ALBUMIN/GLOB SERPL: 1 (ref 1–1.9)
ALP SERPL-CCNC: 96 U/L (ref 40–129)
ALT SERPL-CCNC: 75 U/L (ref 8–55)
ANION GAP SERPL CALC-SCNC: 10 MMOL/L (ref 7–16)
APPEARANCE UR: CLEAR
AST SERPL-CCNC: 58 U/L (ref 15–37)
BASOPHILS # BLD: 0.05 K/UL (ref 0–0.2)
BASOPHILS NFR BLD: 1 % (ref 0–2)
BILIRUB SERPL-MCNC: 0.3 MG/DL (ref 0–1.2)
BILIRUB UR QL: NEGATIVE
BUN SERPL-MCNC: 9 MG/DL (ref 6–23)
CALCIUM SERPL-MCNC: 9.4 MG/DL (ref 8.8–10.2)
CHLORIDE SERPL-SCNC: 105 MMOL/L (ref 98–107)
CHOLEST SERPL-MCNC: 140 MG/DL (ref 0–200)
CO2 SERPL-SCNC: 24 MMOL/L (ref 20–29)
COLOR UR: NORMAL
CREAT SERPL-MCNC: 0.97 MG/DL (ref 0.8–1.3)
CREAT UR-MCNC: 69.6 MG/DL (ref 39–259)
DIFFERENTIAL METHOD BLD: ABNORMAL
EOSINOPHIL # BLD: 0.15 K/UL (ref 0–0.8)
EOSINOPHIL NFR BLD: 2.9 % (ref 0.5–7.8)
ERYTHROCYTE [DISTWIDTH] IN BLOOD BY AUTOMATED COUNT: 15.4 % (ref 11.9–14.6)
EST. AVERAGE GLUCOSE BLD GHB EST-MCNC: 135 MG/DL
GLOBULIN SER CALC-MCNC: 3.8 G/DL (ref 2.3–3.5)
GLUCOSE SERPL-MCNC: 103 MG/DL (ref 70–99)
GLUCOSE UR STRIP.AUTO-MCNC: NEGATIVE MG/DL
HBA1C MFR BLD: 6.3 % (ref 0–5.6)
HCT VFR BLD AUTO: 45.3 % (ref 41.1–50.3)
HDLC SERPL-MCNC: 56 MG/DL (ref 40–60)
HDLC SERPL: 2.5 (ref 0–5)
HGB BLD-MCNC: 14.7 G/DL (ref 13.6–17.2)
HGB UR QL STRIP: NEGATIVE
IMM GRANULOCYTES # BLD AUTO: 0.01 K/UL (ref 0–0.5)
IMM GRANULOCYTES NFR BLD AUTO: 0.2 % (ref 0–5)
KETONES UR QL STRIP.AUTO: NEGATIVE MG/DL
LDLC SERPL CALC-MCNC: 73 MG/DL (ref 0–100)
LEUKOCYTE ESTERASE UR QL STRIP.AUTO: NEGATIVE
LYMPHOCYTES # BLD: 2.15 K/UL (ref 0.5–4.6)
LYMPHOCYTES NFR BLD: 41.4 % (ref 13–44)
MCH RBC QN AUTO: 26.4 PG (ref 26.1–32.9)
MCHC RBC AUTO-ENTMCNC: 32.5 G/DL (ref 31.4–35)
MCV RBC AUTO: 81.3 FL (ref 82–102)
MICROALBUMIN UR-MCNC: 1.21 MG/DL (ref 0–20)
MICROALBUMIN/CREAT UR-RTO: 17 MG/G (ref 0–30)
MONOCYTES # BLD: 0.85 K/UL (ref 0.1–1.3)
MONOCYTES NFR BLD: 16.4 % (ref 4–12)
NEUTS SEG # BLD: 1.98 K/UL (ref 1.7–8.2)
NEUTS SEG NFR BLD: 38.1 % (ref 43–78)
NITRITE UR QL STRIP.AUTO: NEGATIVE
NRBC # BLD: 0 K/UL (ref 0–0.2)
PH UR STRIP: 6.5 (ref 5–9)
PLATELET # BLD AUTO: 187 K/UL (ref 150–450)
PMV BLD AUTO: 13 FL (ref 9.4–12.3)
POTASSIUM SERPL-SCNC: 4.3 MMOL/L (ref 3.5–5.1)
PROT SERPL-MCNC: 7.9 G/DL (ref 6.3–8.2)
PROT UR STRIP-MCNC: NEGATIVE MG/DL
RBC # BLD AUTO: 5.57 M/UL (ref 4.23–5.6)
SODIUM SERPL-SCNC: 140 MMOL/L (ref 136–145)
SP GR UR REFRACTOMETRY: 1.01 (ref 1–1.02)
TRIGL SERPL-MCNC: 57 MG/DL (ref 0–150)
TSH W FREE THYROID IF ABNORMAL: 1.49 UIU/ML (ref 0.27–4.2)
UROBILINOGEN UR QL STRIP.AUTO: 0.2 EU/DL (ref 0.2–1)
VLDLC SERPL CALC-MCNC: 11 MG/DL (ref 6–23)
WBC # BLD AUTO: 5.2 K/UL (ref 4.3–11.1)

## 2025-04-23 PROCEDURE — G8419 CALC BMI OUT NRM PARAM NOF/U: HCPCS

## 2025-04-23 PROCEDURE — 3017F COLORECTAL CA SCREEN DOC REV: CPT

## 2025-04-23 PROCEDURE — 4004F PT TOBACCO SCREEN RCVD TLK: CPT

## 2025-04-23 PROCEDURE — 3075F SYST BP GE 130 - 139MM HG: CPT

## 2025-04-23 PROCEDURE — G8427 DOCREV CUR MEDS BY ELIG CLIN: HCPCS

## 2025-04-23 PROCEDURE — 3079F DIAST BP 80-89 MM HG: CPT

## 2025-04-23 PROCEDURE — 99214 OFFICE O/P EST MOD 30 MIN: CPT

## 2025-04-23 RX ORDER — AMLODIPINE BESYLATE 10 MG/1
10 TABLET ORAL DAILY
Qty: 90 TABLET | Refills: 1 | Status: SHIPPED | OUTPATIENT
Start: 2025-04-23

## 2025-04-23 RX ORDER — TADALAFIL 10 MG/1
10 TABLET ORAL PRN
Qty: 90 TABLET | Refills: 1 | Status: SHIPPED | OUTPATIENT
Start: 2025-04-23

## 2025-04-23 RX ORDER — MELOXICAM 15 MG/1
15 TABLET ORAL DAILY PRN
Qty: 30 TABLET | Refills: 2 | Status: SHIPPED | OUTPATIENT
Start: 2025-04-23

## 2025-04-23 RX ORDER — SERTRALINE HYDROCHLORIDE 100 MG/1
100 TABLET, FILM COATED ORAL DAILY
Qty: 30 TABLET | Refills: 3 | Status: SHIPPED | OUTPATIENT
Start: 2025-04-23

## 2025-04-23 RX ORDER — GABAPENTIN 100 MG/1
100-200 CAPSULE ORAL 3 TIMES DAILY PRN
Qty: 180 CAPSULE | Refills: 2 | Status: SHIPPED | OUTPATIENT
Start: 2025-04-23 | End: 2026-04-18

## 2025-04-23 RX ORDER — ATORVASTATIN CALCIUM 40 MG/1
40 TABLET, FILM COATED ORAL DAILY
Qty: 90 TABLET | Refills: 1 | Status: SHIPPED | OUTPATIENT
Start: 2025-04-23

## 2025-04-23 RX ORDER — IBUPROFEN 800 MG/1
800 TABLET, FILM COATED ORAL EVERY 8 HOURS PRN
Qty: 120 TABLET | Refills: 2 | Status: CANCELLED | OUTPATIENT
Start: 2025-04-23

## 2025-04-23 RX ORDER — METAXALONE 800 MG/1
800 TABLET ORAL 3 TIMES DAILY PRN
Qty: 90 TABLET | Refills: 2 | Status: SHIPPED | OUTPATIENT
Start: 2025-04-23

## 2025-04-23 RX ORDER — PANTOPRAZOLE SODIUM 40 MG/1
40 TABLET, DELAYED RELEASE ORAL DAILY
Qty: 90 TABLET | Refills: 1 | Status: SHIPPED | OUTPATIENT
Start: 2025-04-23

## 2025-04-23 SDOH — ECONOMIC STABILITY: FOOD INSECURITY: WITHIN THE PAST 12 MONTHS, YOU WORRIED THAT YOUR FOOD WOULD RUN OUT BEFORE YOU GOT MONEY TO BUY MORE.: NEVER TRUE

## 2025-04-23 SDOH — ECONOMIC STABILITY: FOOD INSECURITY: WITHIN THE PAST 12 MONTHS, THE FOOD YOU BOUGHT JUST DIDN'T LAST AND YOU DIDN'T HAVE MONEY TO GET MORE.: NEVER TRUE

## 2025-04-23 ASSESSMENT — ENCOUNTER SYMPTOMS
CONSTIPATION: 0
WHEEZING: 0
NAUSEA: 0
CHEST TIGHTNESS: 0
BACK PAIN: 1
BLOOD IN STOOL: 0
TROUBLE SWALLOWING: 0
COUGH: 0
SHORTNESS OF BREATH: 0
ABDOMINAL PAIN: 0
DIARRHEA: 0

## 2025-04-23 ASSESSMENT — PATIENT HEALTH QUESTIONNAIRE - PHQ9
SUM OF ALL RESPONSES TO PHQ QUESTIONS 1-9: 0
2. FEELING DOWN, DEPRESSED OR HOPELESS: NOT AT ALL
SUM OF ALL RESPONSES TO PHQ QUESTIONS 1-9: 0
SUM OF ALL RESPONSES TO PHQ QUESTIONS 1-9: 0
1. LITTLE INTEREST OR PLEASURE IN DOING THINGS: NOT AT ALL
SUM OF ALL RESPONSES TO PHQ QUESTIONS 1-9: 0

## 2025-04-23 NOTE — ASSESSMENT & PLAN NOTE
Chronic, not at goal (unstable), continue current plan pending work up below. Repeat lab work today and consider increasing metformin to BID regimen if A1c is increasing.     Orders:    metFORMIN (GLUCOPHAGE) 500 MG tablet; Take 1 tablet by mouth daily (with breakfast)    Hemoglobin A1C; Future    TSH reflex to FT4; Future    Lipid Panel; Future    Comprehensive Metabolic Panel; Future    CBC with Auto Differential; Future    Urinalysis; Future    Albumin/Creatinine Ratio, Urine; Future

## 2025-04-23 NOTE — PROGRESS NOTES
/89 (BP Site: Left Upper Arm, Patient Position: Sitting, BP Cuff Size: Large Adult)   Pulse 70   Temp 96.8 °F (36 °C)   Resp 19   Ht 1.727 m (5' 8\")   Wt 87.1 kg (192 lb)   SpO2 97%   BMI 29.19 kg/m²           An electronic signature was used to authenticate this note.    --KATIE Alanis - NP

## 2025-04-23 NOTE — ASSESSMENT & PLAN NOTE
Chronic, not at goal (unstable), changes made today: Advised pt trial daily mobic and neurontin to see if his symptoms can be better managed with a more consistent regimen rather than a PRN basis. Advised to continue with daily protonix as well. Will follow up in 4 weeks to evaluate. Discussed a referral to pain management if no improvement in symptoms.     Orders:    gabapentin (NEURONTIN) 100 MG capsule; Take 1-2 capsules by mouth 3 times daily as needed (chronic pain) for up to 360 days.    meloxicam (MOBIC) 15 MG tablet; Take 1 tablet by mouth daily as needed for Pain    metaxalone (SKELAXIN) 800 MG tablet; Take 1 tablet by mouth 3 times daily as needed for Pain

## 2025-04-29 ENCOUNTER — RESULTS FOLLOW-UP (OUTPATIENT)
Dept: FAMILY MEDICINE CLINIC | Facility: CLINIC | Age: 55
End: 2025-04-29

## 2025-04-29 NOTE — RESULT ENCOUNTER NOTE
Please let Jackson know that his liver enzymes are elevated, and have been trending up over the past year. We can repeat this lab work at his OV in a few weeks to trend, but he may need an abd US and/or GI referral. Advise to avoid etoh and tylenol prior to labs.    HgbA1c, thyroid, and cholesterol labs are stable / baseline.     Thanks,  Maegan

## 2025-04-29 NOTE — TELEPHONE ENCOUNTER
----- Message from KATIE Alanis NP sent at 4/29/2025  2:17 PM EDT -----  Please let Jackson know that his liver enzymes are elevated, and have been trending up over the past year. We can repeat this lab work at his OV in a few weeks to trend, but he may need an abd US and/or GI referral. Advise to avoid etoh and tylenol pr  ior to labs.    HgbA1c, thyroid, and cholesterol labs are stable / baseline.     Thanks,  Maegan

## 2025-05-21 ENCOUNTER — LAB (OUTPATIENT)
Dept: FAMILY MEDICINE CLINIC | Facility: CLINIC | Age: 55
End: 2025-05-21

## 2025-05-21 ENCOUNTER — OFFICE VISIT (OUTPATIENT)
Dept: FAMILY MEDICINE CLINIC | Facility: CLINIC | Age: 55
End: 2025-05-21
Payer: COMMERCIAL

## 2025-05-21 VITALS
TEMPERATURE: 96.9 F | OXYGEN SATURATION: 97 % | DIASTOLIC BLOOD PRESSURE: 88 MMHG | RESPIRATION RATE: 19 BRPM | HEART RATE: 70 BPM | BODY MASS INDEX: 29.25 KG/M2 | SYSTOLIC BLOOD PRESSURE: 135 MMHG | HEIGHT: 68 IN | WEIGHT: 193 LBS

## 2025-05-21 DIAGNOSIS — R74.8 ELEVATED LIVER ENZYMES: ICD-10-CM

## 2025-05-21 DIAGNOSIS — R10.12 INTERMITTENT LEFT UPPER QUADRANT ABDOMINAL PAIN: ICD-10-CM

## 2025-05-21 DIAGNOSIS — F41.9 ANXIETY: Primary | ICD-10-CM

## 2025-05-21 DIAGNOSIS — N52.9 ERECTILE DYSFUNCTION, UNSPECIFIED ERECTILE DYSFUNCTION TYPE: ICD-10-CM

## 2025-05-21 PROCEDURE — 3017F COLORECTAL CA SCREEN DOC REV: CPT

## 2025-05-21 PROCEDURE — G8427 DOCREV CUR MEDS BY ELIG CLIN: HCPCS

## 2025-05-21 PROCEDURE — 3075F SYST BP GE 130 - 139MM HG: CPT

## 2025-05-21 PROCEDURE — 99214 OFFICE O/P EST MOD 30 MIN: CPT

## 2025-05-21 PROCEDURE — G8419 CALC BMI OUT NRM PARAM NOF/U: HCPCS

## 2025-05-21 PROCEDURE — 3079F DIAST BP 80-89 MM HG: CPT

## 2025-05-21 PROCEDURE — 4004F PT TOBACCO SCREEN RCVD TLK: CPT

## 2025-05-21 RX ORDER — BUSPIRONE HYDROCHLORIDE 5 MG/1
5 TABLET ORAL 2 TIMES DAILY PRN
Qty: 60 TABLET | Refills: 1 | Status: SHIPPED | OUTPATIENT
Start: 2025-05-21 | End: 2025-07-20

## 2025-05-21 RX ORDER — TADALAFIL 10 MG/1
10 TABLET ORAL PRN
Qty: 90 TABLET | Refills: 1 | Status: SHIPPED | OUTPATIENT
Start: 2025-05-21

## 2025-05-21 ASSESSMENT — ENCOUNTER SYMPTOMS
BLOOD IN STOOL: 0
DIARRHEA: 0
CHEST TIGHTNESS: 0
CONSTIPATION: 0
NAUSEA: 0
SHORTNESS OF BREATH: 0
ABDOMINAL PAIN: 1

## 2025-05-21 NOTE — PROGRESS NOTES
Jackson Nath (:  1970) is a 54 y.o. male,Established patient, here for evaluation of the following chief complaint(s):  Follow-up (On Anxiety. Wants to talk about the leer enzymes as well.)         Chief Complaint   Patient presents with    Follow-up     On Anxiety. Wants to talk about the leer enzymes as well.       Assessment & Plan  Anxiety   Chronic, not at goal (unstable), changes made today: add buspar 5 mg BID PRN and follow up in 4 weeks. Discussed counseling services with hx of ptsd. Pt will let me know if he would like a referral. Consider adding wellbutrin or increasing SSRI if PRN med not effective in 4 weeks.     Orders:    busPIRone (BUSPAR) 5 MG tablet; Take 1 tablet by mouth 2 times daily as needed (anxiety)    Elevated liver enzymes   Chronic, not at goal (unstable), changes made today: repeat labs today and order abd US / GI referral if indicated.     Orders:    Comprehensive Metabolic Panel; Future    Lipase; Future    Intermittent left upper quadrant abdominal pain   New, not at goal (unstable), check labs and lipase. Abd US if indicated.     Orders:    Comprehensive Metabolic Panel; Future    Lipase; Future    Erectile dysfunction, unspecified erectile dysfunction type   Chronic, at goal (stable), continue current treatment plan    Orders:    tadalafil (CIALIS) 10 MG tablet; Take 1 tablet by mouth as needed for Erectile Dysfunction      Return in about 4 weeks (around 2025) for 4 week f/u anxiety and abdominal pain VV is ok.       Subjective   Pt presents for 4 week f/u:    -Anxiety - Zoloft was increased from 50 mg to 100 mg 4 weeks ago. Pt states he is doing better, but still has bouts of anxiety. He is not aware of any particular triggers nor does he know when they will strike. 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 Denies medication SE's, SI, HI.     -Elevated liver enzymes:

## 2025-05-21 NOTE — ASSESSMENT & PLAN NOTE
Chronic, not at goal (unstable), changes made today: add buspar 5 mg BID PRN and follow up in 4 weeks. Discussed counseling services with hx of ptsd. Pt will let me know if he would like a referral. Consider adding wellbutrin or increasing SSRI if PRN med not effective in 4 weeks.     Orders:    busPIRone (BUSPAR) 5 MG tablet; Take 1 tablet by mouth 2 times daily as needed (anxiety)

## 2025-05-22 ENCOUNTER — RESULTS FOLLOW-UP (OUTPATIENT)
Dept: FAMILY MEDICINE CLINIC | Facility: CLINIC | Age: 55
End: 2025-05-22

## 2025-05-22 DIAGNOSIS — R74.8 ELEVATED LIVER ENZYMES: Primary | ICD-10-CM

## 2025-05-22 LAB
ALBUMIN SERPL-MCNC: 3.7 G/DL (ref 3.5–5)
ALBUMIN/GLOB SERPL: 1 (ref 1–1.9)
ALP SERPL-CCNC: 73 U/L (ref 40–129)
ALT SERPL-CCNC: 57 U/L (ref 8–55)
ANION GAP SERPL CALC-SCNC: 11 MMOL/L (ref 7–16)
AST SERPL-CCNC: 41 U/L (ref 15–37)
BILIRUB SERPL-MCNC: <0.2 MG/DL (ref 0–1.2)
BUN SERPL-MCNC: 16 MG/DL (ref 6–23)
CALCIUM SERPL-MCNC: 9.6 MG/DL (ref 8.8–10.2)
CHLORIDE SERPL-SCNC: 104 MMOL/L (ref 98–107)
CO2 SERPL-SCNC: 24 MMOL/L (ref 20–29)
CREAT SERPL-MCNC: 1.18 MG/DL (ref 0.8–1.3)
GLOBULIN SER CALC-MCNC: 3.7 G/DL (ref 2.3–3.5)
GLUCOSE SERPL-MCNC: 106 MG/DL (ref 70–99)
LIPASE SERPL-CCNC: 97 U/L (ref 13–60)
POTASSIUM SERPL-SCNC: 4.2 MMOL/L (ref 3.5–5.1)
PROT SERPL-MCNC: 7.3 G/DL (ref 6.3–8.2)
SODIUM SERPL-SCNC: 139 MMOL/L (ref 136–145)

## 2025-05-22 NOTE — RESULT ENCOUNTER NOTE
Please let Jackson know that his labs were still slightly elevated, so I will order an abdominal US to further investigate like we discussed in OV yesterday.   Thanks,  Maegan

## 2025-05-23 NOTE — TELEPHONE ENCOUNTER
----- Message from KATIE Alanis NP sent at 5/22/2025  4:04 PM EDT -----  Please let Jackson know that his labs were still slightly elevated, so I will order an abdominal US to further investigate like we discussed in OV yesterday.   Thanks,  Maegan

## 2025-05-28 ENCOUNTER — TELEPHONE (OUTPATIENT)
Dept: FAMILY MEDICINE CLINIC | Facility: CLINIC | Age: 55
End: 2025-05-28

## 2025-05-28 NOTE — TELEPHONE ENCOUNTER
I called the patient back, but got his voice mail box. I left him a message giving him the radiology scheduling department number. I asked him to please call back if there are any questions.

## 2025-06-20 ENCOUNTER — TELEMEDICINE (OUTPATIENT)
Dept: FAMILY MEDICINE CLINIC | Facility: CLINIC | Age: 55
End: 2025-06-20
Payer: COMMERCIAL

## 2025-06-20 DIAGNOSIS — F41.9 ANXIETY: Primary | ICD-10-CM

## 2025-06-20 PROCEDURE — 99213 OFFICE O/P EST LOW 20 MIN: CPT

## 2025-06-20 ASSESSMENT — ENCOUNTER SYMPTOMS
GASTROINTESTINAL NEGATIVE: 1
RESPIRATORY NEGATIVE: 1

## 2025-06-20 NOTE — ASSESSMENT & PLAN NOTE
Chronic, not at goal (unstable), pt is going to  new rx for buspar from pharmacy today. Discussed proper use of medication and following up in 4 weeks to evaluate. If symptoms persist will refer to psych for further evaluation and/ or therapy.

## 2025-06-20 NOTE — PROGRESS NOTES
Jackson Nath, was evaluated through a synchronous (real-time) audio-video encounter. The patient (or guardian if applicable) is aware that this is a billable service, which includes applicable co-pays. This Virtual Visit was conducted with patient's (and/or legal guardian's) consent. Patient identification was verified, and a caregiver was present when appropriate.   The patient was located at Home: [address] [place] [id number]  Provider was located at Facility (Appt Dept): [address] [place] [phone]  Confirm you are appropriately licensed, registered, or certified to deliver care in the state where the patient is located as indicated above. If you are not or unsure, please re-schedule the visit: Yes, I confirm.     Jackson Nath (:  1970) is a Established patient, presenting virtually for evaluation of the following:    Chief Complaint   Patient presents with    Follow-up     On Anxiety. Has not been taking the medication as he stated that he got a text from the pharmacy about there being a problems with the prescription. Still having the same problems.       Below is the assessment and plan developed based on review of pertinent history, physical exam, labs, studies, and medications.     Assessment & Plan  Anxiety   Chronic, not at goal (unstable), pt is going to  new rx for buspar from pharmacy today. Discussed proper use of medication and following up in 4 weeks to evaluate. If symptoms persist will refer to psych for further evaluation and/ or therapy.            Return in about 4 weeks (around 2025).       Subjective   Pt presents for 4 week f/u:     -Anxiety - Zoloft was increased from 50 mg to 100 mg 4 weeks ago. Pt states he is doing better, but still has bouts of anxiety. He is not aware of any particular triggers nor does he know when they will strike. 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

## 2025-07-18 ENCOUNTER — TELEMEDICINE (OUTPATIENT)
Dept: FAMILY MEDICINE CLINIC | Facility: CLINIC | Age: 55
End: 2025-07-18

## 2025-07-18 DIAGNOSIS — F41.9 ANXIETY: Primary | ICD-10-CM

## 2025-07-18 ASSESSMENT — ENCOUNTER SYMPTOMS
GASTROINTESTINAL NEGATIVE: 1
RESPIRATORY NEGATIVE: 1

## 2025-07-18 NOTE — PROGRESS NOTES
Jackson Nath, was evaluated through a synchronous (real-time) audio-video encounter. The patient (or guardian if applicable) is aware that this is a billable service, which includes applicable co-pays. This Virtual Visit was conducted with patient's (and/or legal guardian's) consent. Patient identification was verified, and a caregiver was present when appropriate.   The patient was located at Home: [address] [place] [id number]  Provider was located at Facility (Appt Dept): [address] [place] [id number]  Confirm you are appropriately licensed, registered, or certified to deliver care in the state where the patient is located as indicated above. If you are not or unsure, please re-schedule the visit: Yes, I confirm.     Jackson Nath (:  1970) is a Established patient, presenting virtually for evaluation of the following:    Chief Complaint   Patient presents with    Follow-up     On Buspirone.       Below is the assessment and plan developed based on review of pertinent history, physical exam, labs, studies, and medications.     Assessment & Plan  Anxiety   Chronic, at goal (stable), continue current treatment plan: continue zoloft 100 mg daily, and PRN buspar.            Return for 4 months for chronic conditions.       Subjective   Pt presents for 4 week f/u:     -Anxiety - Zoloft was increased from 50 mg to 100 mg 4 weeks ago. Pt states he is doing better, but still has bouts of anxiety. He is not aware of any particular triggers nor does he know when they will strike. 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 Denies medication SE's, SI, HI.      -Update: 26- added buspar 5 mg BID PRN 4 weeks ago, however pt states he did not pick this up from the pharmacy or if he did he must have misplaced this because he cannot find it anywhere. Discussed picking up his new rx as

## 2025-07-18 NOTE — ASSESSMENT & PLAN NOTE
Chronic, at goal (stable), continue current treatment plan: continue zoloft 100 mg daily, and PRN buspar.

## (undated) DEVICE — SYR 5ML 1/5 GRAD LL NSAF LF --

## (undated) DEVICE — SYR 3ML LL TIP 1/10ML GRAD --

## (undated) DEVICE — CONNECTOR TBNG OD5-7MM O2 END DISP

## (undated) DEVICE — NDL PRT INJ NSAF BLNT 18GX1.5 --

## (undated) DEVICE — CANNULA NSL ORAL AD FOR CAPNOFLEX CO2 O2 AIRLFE

## (undated) DEVICE — KENDALL RADIOLUCENT FOAM MONITORING ELECTRODE RECTANGULAR SHAPE: Brand: KENDALL